# Patient Record
Sex: MALE | Race: BLACK OR AFRICAN AMERICAN | NOT HISPANIC OR LATINO | Employment: UNEMPLOYED | URBAN - METROPOLITAN AREA
[De-identification: names, ages, dates, MRNs, and addresses within clinical notes are randomized per-mention and may not be internally consistent; named-entity substitution may affect disease eponyms.]

---

## 2017-05-30 ENCOUNTER — HISTORICAL (OUTPATIENT)
Dept: ADMINISTRATIVE | Facility: HOSPITAL | Age: 62
End: 2017-05-30

## 2017-05-30 LAB
ABS NEUT (OLG): 1.27 X10(3)/MCL (ref 2.1–9.2)
ALBUMIN SERPL-MCNC: 3.7 GM/DL (ref 3.4–5)
ALBUMIN/GLOB SERPL: 1 RATIO (ref 1–2)
ALP SERPL-CCNC: 50 UNIT/L (ref 20–120)
ALT SERPL-CCNC: 21 UNIT/L
ANISOCYTOSIS BLD QL SMEAR: NORMAL
APPEARANCE, UA: CLEAR
AST SERPL-CCNC: 25 UNIT/L
BACTERIA #/AREA URNS AUTO: ABNORMAL /[HPF]
BASOPHILS NFR BLD MANUAL: 1 %
BILIRUB SERPL-MCNC: 0.6 MG/DL
BILIRUB UR QL STRIP: NEGATIVE
BILIRUBIN DIRECT+TOT PNL SERPL-MCNC: <0.1 MG/DL
BILIRUBIN DIRECT+TOT PNL SERPL-MCNC: >0.5 MG/DL
BUN SERPL-MCNC: 17 MG/DL (ref 7–25)
CALCIUM SERPL-MCNC: 8.8 MG/DL (ref 8.4–10.3)
CHLORIDE SERPL-SCNC: 104 MMOL/L (ref 96–110)
CHOLEST SERPL-MCNC: 194 MG/DL
CHOLEST/HDLC SERPL: 3.2 {RATIO} (ref 0–5)
CO2 SERPL-SCNC: 31 MMOL/L (ref 24–32)
COLOR UR: YELLOW
CREAT SERPL-MCNC: 1.07 MG/DL (ref 0.7–1.4)
EOSINOPHIL NFR BLD MANUAL: 0 %
ERYTHROCYTE [DISTWIDTH] IN BLOOD BY AUTOMATED COUNT: 14.3 % (ref 11.5–14.5)
EST. AVERAGE GLUCOSE BLD GHB EST-MCNC: 108 MG/DL
GLOBULIN SER-MCNC: 3.2 GM/ML (ref 2.3–3.5)
GLUCOSE (UA): NORMAL
GLUCOSE SERPL-MCNC: 97 MG/DL (ref 65–99)
GRANULOCYTES NFR BLD MANUAL: 36 %
HBA1C MFR BLD: 5.4 % (ref 4.7–5.6)
HCT VFR BLD AUTO: 40.2 % (ref 40–51)
HDLC SERPL-MCNC: 60 MG/DL
HGB BLD-MCNC: 12.7 GM/DL (ref 13.5–17.5)
HGB UR QL STRIP: 0.06 MG/DL
HYALINE CASTS #/AREA URNS LPF: ABNORMAL /[LPF]
KETONES UR QL STRIP: ABNORMAL
LDLC SERPL CALC-MCNC: 121 MG/DL (ref 0–130)
LEUKOCYTE ESTERASE UR QL STRIP: NEGATIVE
LYMPHOCYTES NFR BLD MANUAL: 50 %
MACROCYTES BLD QL SMEAR: NORMAL
MCH RBC QN AUTO: 26.6 PG (ref 26–34)
MCHC RBC AUTO-ENTMCNC: 31.6 GM/DL (ref 31–37)
MCV RBC AUTO: 84.3 FL (ref 80–100)
MONOCYTES NFR BLD MANUAL: 8 %
NITRITE UR QL STRIP: NEGATIVE
PH UR STRIP: 5.5 [PH] (ref 4.5–8)
PLATELET # BLD AUTO: 190 X10(3)/MCL (ref 130–400)
PLATELET # BLD EST: ADEQUATE 10*3/UL
PLATELET # BLD EST: ADEQUATE 10*3/UL
PMV BLD AUTO: 10 FL (ref 7.4–10.4)
POIKILOCYTOSIS BLD QL SMEAR: NORMAL
POTASSIUM SERPL-SCNC: 4.8 MMOL/L (ref 3.6–5.2)
PROT SERPL-MCNC: 6.9 GM/DL (ref 6–8)
PROT UR QL STRIP: NEGATIVE
RBC # BLD AUTO: 4.77 X10(6)/MCL (ref 4.5–5.9)
RBC #/AREA URNS AUTO: ABNORMAL /[HPF]
RBC MORPH BLD: NORMAL
SODIUM SERPL-SCNC: 140 MMOL/L (ref 135–146)
SP GR UR STRIP: 1.03 (ref 1–1.03)
SQUAMOUS #/AREA URNS LPF: ABNORMAL /[LPF]
TRIGL SERPL-MCNC: 65 MG/DL
TSH SERPL-ACNC: 1.91 MIU/L (ref 0.5–5)
UROBILINOGEN UR STRIP-ACNC: NORMAL
VLDLC SERPL CALC-MCNC: 13 MG/DL
WBC # SPEC AUTO: 4.1 X10(3)/MCL (ref 4.5–11)
WBC #/AREA URNS AUTO: ABNORMAL /HPF

## 2017-09-13 ENCOUNTER — HOSPITAL ENCOUNTER (OUTPATIENT)
Dept: MEDSURG UNIT | Facility: HOSPITAL | Age: 62
End: 2017-09-13
Attending: FAMILY MEDICINE | Admitting: FAMILY MEDICINE

## 2017-09-13 LAB
ABS NEUT (OLG): 2.18 X10(3)/MCL (ref 2.1–9.2)
ALBUMIN SERPL-MCNC: 3.5 GM/DL (ref 3.4–5)
ALBUMIN/GLOB SERPL: 1 RATIO (ref 1–2)
ALP SERPL-CCNC: 55 UNIT/L (ref 45–117)
ALT SERPL-CCNC: 36 UNIT/L (ref 12–78)
AMPHET UR QL SCN: NEGATIVE
APPEARANCE, UA: CLEAR
APTT PPP: 32.2 SECOND(S) (ref 23.3–37)
AST SERPL-CCNC: 21 UNIT/L (ref 15–37)
BACTERIA #/AREA URNS AUTO: ABNORMAL /[HPF]
BARBITURATE SCN PRESENT UR: NEGATIVE
BASOPHILS # BLD AUTO: 0.05 X10(3)/MCL
BASOPHILS NFR BLD AUTO: 1 % (ref 0–1)
BENZODIAZ UR QL SCN: NEGATIVE
BILIRUB SERPL-MCNC: 0.6 MG/DL (ref 0.2–1)
BILIRUB UR QL STRIP: NEGATIVE
BILIRUBIN DIRECT+TOT PNL SERPL-MCNC: 0.2 MG/DL
BILIRUBIN DIRECT+TOT PNL SERPL-MCNC: 0.4 MG/DL
BUN SERPL-MCNC: 12 MG/DL (ref 7–18)
CALCIUM SERPL-MCNC: 8.5 MG/DL (ref 8.5–10.1)
CANNABINOIDS UR QL SCN: NEGATIVE
CHLORIDE SERPL-SCNC: 104 MMOL/L (ref 98–107)
CK MB SERPL-MCNC: 1.6 NG/ML (ref 1–3.6)
CK SERPL-CCNC: 265 UNIT/L (ref 39–308)
CO2 SERPL-SCNC: 29 MMOL/L (ref 21–32)
COCAINE UR QL SCN: NEGATIVE
COLOR UR: YELLOW
CREAT SERPL-MCNC: 1 MG/DL (ref 0.6–1.3)
EOSINOPHIL # BLD AUTO: 0.13 X10(3)/MCL
EOSINOPHIL NFR BLD AUTO: 3 % (ref 0–5)
ERYTHROCYTE [DISTWIDTH] IN BLOOD BY AUTOMATED COUNT: 15.3 % (ref 11.5–14.5)
GLOBULIN SER-MCNC: 4 GM/ML (ref 2.3–3.5)
GLUCOSE (UA): NORMAL
GLUCOSE SERPL-MCNC: 92 MG/DL (ref 74–106)
HCT VFR BLD AUTO: 38.6 % (ref 40–51)
HGB BLD-MCNC: 12.5 GM/DL (ref 13.5–17.5)
HGB UR QL STRIP: NEGATIVE
HYALINE CASTS #/AREA URNS LPF: ABNORMAL /[LPF]
IMM GRANULOCYTES # BLD AUTO: 0.01 10*3/UL
IMM GRANULOCYTES NFR BLD AUTO: 0 %
INR PPP: 1.07 (ref 0.9–1.2)
KETONES UR QL STRIP: NEGATIVE
LEUKOCYTE ESTERASE UR QL STRIP: NEGATIVE
LYMPHOCYTES # BLD AUTO: 1.64 X10(3)/MCL
LYMPHOCYTES NFR BLD AUTO: 38 % (ref 15–40)
MCH RBC QN AUTO: 26.7 PG (ref 26–34)
MCHC RBC AUTO-ENTMCNC: 32.4 GM/DL (ref 31–37)
MCV RBC AUTO: 82.5 FL (ref 80–100)
MONOCYTES # BLD AUTO: 0.35 X10(3)/MCL
MONOCYTES NFR BLD AUTO: 8 % (ref 4–12)
NEUTROPHILS # BLD AUTO: 2.18 X10(3)/MCL
NEUTROPHILS NFR BLD AUTO: 50 X10(3)/MCL
NITRITE UR QL STRIP: NEGATIVE
OPIATES UR QL SCN: NEGATIVE
PCP UR QL: NEGATIVE
PH UR STRIP.AUTO: 7 [PH] (ref 5–8)
PH UR STRIP: 7 [PH] (ref 4.5–8)
PLATELET # BLD AUTO: 181 X10(3)/MCL (ref 130–400)
PMV BLD AUTO: 9.9 FL (ref 7.4–10.4)
POC TROPONIN: 0 NG/ML (ref 0–0.08)
POTASSIUM SERPL-SCNC: 3.8 MMOL/L (ref 3.5–5.1)
PROT SERPL-MCNC: 7.5 GM/DL (ref 6.4–8.2)
PROT UR QL STRIP: NEGATIVE
PROTHROMBIN TIME: 13.7 SECOND(S) (ref 11.9–14.4)
RBC # BLD AUTO: 4.68 X10(6)/MCL (ref 4.5–5.9)
RBC #/AREA URNS AUTO: ABNORMAL /[HPF]
SODIUM SERPL-SCNC: 140 MMOL/L (ref 136–145)
SP GR UR STRIP: 1.02 (ref 1–1.03)
SQUAMOUS #/AREA URNS LPF: ABNORMAL /[LPF]
TEMPERATURE, URINE (OHS): 24 DEGC (ref 20–25)
TROPONIN I SERPL-MCNC: <0.015 NG/ML (ref 0–0.05)
TSH SERPL-ACNC: 2.66 MIU/L (ref 0.36–3.74)
UROBILINOGEN UR STRIP-ACNC: NORMAL
WBC # SPEC AUTO: 4.4 X10(3)/MCL (ref 4.5–11)
WBC #/AREA URNS AUTO: ABNORMAL /HPF

## 2017-10-17 ENCOUNTER — HISTORICAL (OUTPATIENT)
Dept: FAMILY MEDICINE | Facility: CLINIC | Age: 62
End: 2017-10-17

## 2017-10-25 ENCOUNTER — HISTORICAL (OUTPATIENT)
Dept: ADMINISTRATIVE | Facility: HOSPITAL | Age: 62
End: 2017-10-25

## 2017-11-15 ENCOUNTER — HISTORICAL (OUTPATIENT)
Dept: RADIOLOGY | Facility: HOSPITAL | Age: 62
End: 2017-11-15

## 2018-01-31 ENCOUNTER — HISTORICAL (OUTPATIENT)
Dept: ADMINISTRATIVE | Facility: HOSPITAL | Age: 63
End: 2018-01-31

## 2019-05-17 ENCOUNTER — HISTORICAL (OUTPATIENT)
Dept: ADMINISTRATIVE | Facility: HOSPITAL | Age: 64
End: 2019-05-17

## 2019-05-17 LAB
CHOLEST SERPL-MCNC: 202 MG/DL
CHOLEST/HDLC SERPL: 2.8 {RATIO} (ref 0–5)
EST. AVERAGE GLUCOSE BLD GHB EST-MCNC: 114 MG/DL
FERRITIN SERPL-MCNC: 82.8 NG/ML (ref 26–388)
HBA1C MFR BLD: 5.6 % (ref 4.2–6.3)
HDLC SERPL-MCNC: 71 MG/DL
IRON SATN MFR SERPL: 15.7 % (ref 15–50)
IRON SERPL-MCNC: 63 MCG/DL (ref 65–175)
LDLC SERPL CALC-MCNC: 119 MG/DL (ref 0–130)
TIBC SERPL-MCNC: 401 MCG/DL (ref 250–450)
TRANSFERRIN SERPL-MCNC: 256 MG/DL (ref 200–360)
TRIGL SERPL-MCNC: 61 MG/DL
VLDLC SERPL CALC-MCNC: 12 MG/DL

## 2019-05-22 ENCOUNTER — HISTORICAL (OUTPATIENT)
Dept: FAMILY MEDICINE | Facility: CLINIC | Age: 64
End: 2019-05-22

## 2019-09-10 ENCOUNTER — HISTORICAL (OUTPATIENT)
Dept: CARDIOLOGY | Facility: CLINIC | Age: 64
End: 2019-09-10

## 2019-09-10 LAB
ALBUMIN SERPL-MCNC: 3.5 GM/DL (ref 3.4–5)
ALBUMIN/GLOB SERPL: 1 RATIO (ref 1.1–2)
ALP SERPL-CCNC: 60 UNIT/L (ref 45–117)
ALT SERPL-CCNC: 26 UNIT/L (ref 12–78)
AST SERPL-CCNC: 21 UNIT/L (ref 15–37)
BILIRUB SERPL-MCNC: 0.5 MG/DL (ref 0.2–1)
BILIRUBIN DIRECT+TOT PNL SERPL-MCNC: 0.1 MG/DL
BILIRUBIN DIRECT+TOT PNL SERPL-MCNC: 0.4 MG/DL
BUN SERPL-MCNC: 17 MG/DL (ref 7–18)
CALCIUM SERPL-MCNC: 8.7 MG/DL (ref 8.5–10.1)
CHLORIDE SERPL-SCNC: 107 MMOL/L (ref 98–107)
CHOLEST SERPL-MCNC: 200 MG/DL
CHOLEST/HDLC SERPL: 2.8 {RATIO} (ref 0–5)
CO2 SERPL-SCNC: 34 MMOL/L (ref 21–32)
CREAT SERPL-MCNC: 1.4 MG/DL (ref 0.6–1.3)
GLOBULIN SER-MCNC: 3.6 GM/ML (ref 2.3–3.5)
GLUCOSE SERPL-MCNC: 100 MG/DL (ref 74–106)
HDLC SERPL-MCNC: 72 MG/DL (ref 40–59)
LDLC SERPL CALC-MCNC: 116 MG/DL
POTASSIUM SERPL-SCNC: 4.5 MMOL/L (ref 3.5–5.1)
PROT SERPL-MCNC: 7.1 GM/DL (ref 6.4–8.2)
SODIUM SERPL-SCNC: 143 MMOL/L (ref 136–145)
TRIGL SERPL-MCNC: 58 MG/DL
VLDLC SERPL CALC-MCNC: 12 MG/DL

## 2019-10-24 ENCOUNTER — HISTORICAL (OUTPATIENT)
Dept: FAMILY MEDICINE | Facility: CLINIC | Age: 64
End: 2019-10-24

## 2019-11-01 ENCOUNTER — HISTORICAL (OUTPATIENT)
Dept: RADIOLOGY | Facility: HOSPITAL | Age: 64
End: 2019-11-01

## 2020-01-24 ENCOUNTER — HISTORICAL (OUTPATIENT)
Dept: ADMINISTRATIVE | Facility: HOSPITAL | Age: 65
End: 2020-01-24

## 2020-01-24 LAB — TESTOST SERPL-MCNC: 487 NG/DL (ref 241–827)

## 2020-03-11 ENCOUNTER — HISTORICAL (OUTPATIENT)
Dept: RADIOLOGY | Facility: HOSPITAL | Age: 65
End: 2020-03-11

## 2020-06-15 ENCOUNTER — HISTORICAL (OUTPATIENT)
Dept: CARDIOLOGY | Facility: HOSPITAL | Age: 65
End: 2020-06-15

## 2020-08-10 ENCOUNTER — HISTORICAL (OUTPATIENT)
Dept: CARDIOLOGY | Facility: CLINIC | Age: 65
End: 2020-08-10

## 2020-08-10 LAB
CHOLEST SERPL-MCNC: 227 MG/DL
CHOLEST/HDLC SERPL: 3.4 {RATIO} (ref 0–5)
HDLC SERPL-MCNC: 67 MG/DL (ref 40–59)
LDLC SERPL CALC-MCNC: 149 MG/DL
TRIGL SERPL-MCNC: 55 MG/DL
VLDLC SERPL CALC-MCNC: 11 MG/DL

## 2020-11-12 ENCOUNTER — HISTORICAL (OUTPATIENT)
Dept: CARDIOLOGY | Facility: CLINIC | Age: 65
End: 2020-11-12

## 2020-11-12 LAB
T4 FREE SERPL-MCNC: 0.94 NG/DL (ref 0.7–1.48)
TSH SERPL-ACNC: 1.15 UIU/ML (ref 0.35–4.94)

## 2020-11-14 ENCOUNTER — HISTORICAL (OUTPATIENT)
Dept: ADMINISTRATIVE | Facility: HOSPITAL | Age: 65
End: 2020-11-14

## 2020-11-14 LAB — STREP A PCR (OHS): NOT DETECTED

## 2021-01-15 ENCOUNTER — HISTORICAL (OUTPATIENT)
Dept: ADMINISTRATIVE | Facility: HOSPITAL | Age: 66
End: 2021-01-15

## 2021-01-15 LAB
BUN SERPL-MCNC: 15 MG/DL (ref 8.4–25.7)
CALCIUM SERPL-MCNC: 8.7 MG/DL (ref 8.8–10)
CHLORIDE SERPL-SCNC: 105 MMOL/L (ref 98–107)
CHOLEST SERPL-MCNC: 198 MG/DL
CHOLEST/HDLC SERPL: 3 {RATIO} (ref 0–5)
CO2 SERPL-SCNC: 31 MMOL/L (ref 23–31)
CREAT SERPL-MCNC: 1.14 MG/DL (ref 0.73–1.18)
CREAT/UREA NIT SERPL: 13
EST. AVERAGE GLUCOSE BLD GHB EST-MCNC: 108.3 MG/DL
GLUCOSE SERPL-MCNC: 85 MG/DL (ref 82–115)
HBA1C MFR BLD: 5.4 %
HDLC SERPL-MCNC: 63 MG/DL (ref 35–60)
LDLC SERPL CALC-MCNC: 122 MG/DL (ref 50–140)
POTASSIUM SERPL-SCNC: 3.9 MMOL/L (ref 3.5–5.1)
PSA SERPL-MCNC: 0.91 NG/ML
SODIUM SERPL-SCNC: 144 MMOL/L (ref 136–145)
TRIGL SERPL-MCNC: 63 MG/DL (ref 34–140)
VLDLC SERPL CALC-MCNC: 13 MG/DL

## 2021-04-14 ENCOUNTER — HISTORICAL (OUTPATIENT)
Dept: RADIOLOGY | Facility: HOSPITAL | Age: 66
End: 2021-04-14

## 2021-05-21 ENCOUNTER — HISTORICAL (OUTPATIENT)
Dept: CARDIOLOGY | Facility: HOSPITAL | Age: 66
End: 2021-05-21

## 2021-05-21 LAB
ALBUMIN SERPL-MCNC: 3.6 GM/DL (ref 3.4–4.8)
ALBUMIN/GLOB SERPL: 1.1 RATIO (ref 1.1–2)
ALP SERPL-CCNC: 59 UNIT/L (ref 40–150)
ALT SERPL-CCNC: 19 UNIT/L (ref 0–55)
AST SERPL-CCNC: 20 UNIT/L (ref 5–34)
BILIRUB SERPL-MCNC: 0.8 MG/DL
BILIRUBIN DIRECT+TOT PNL SERPL-MCNC: 0.3 MG/DL (ref 0–0.5)
BILIRUBIN DIRECT+TOT PNL SERPL-MCNC: 0.5 MG/DL (ref 0–0.8)
BUN SERPL-MCNC: 14.5 MG/DL (ref 8.4–25.7)
CALCIUM SERPL-MCNC: 9.2 MG/DL (ref 8.8–10)
CHLORIDE SERPL-SCNC: 105 MMOL/L (ref 98–107)
CHOLEST SERPL-MCNC: 191 MG/DL
CHOLEST/HDLC SERPL: 4 {RATIO} (ref 0–5)
CO2 SERPL-SCNC: 29 MMOL/L (ref 23–31)
CREAT SERPL-MCNC: 1.24 MG/DL (ref 0.73–1.18)
GLOBULIN SER-MCNC: 3.3 GM/DL (ref 2.4–3.5)
GLUCOSE SERPL-MCNC: 98 MG/DL (ref 82–115)
HDLC SERPL-MCNC: 54 MG/DL (ref 35–60)
LDLC SERPL CALC-MCNC: 123 MG/DL (ref 50–140)
POTASSIUM SERPL-SCNC: 4.1 MMOL/L (ref 3.5–5.1)
PROT SERPL-MCNC: 6.9 GM/DL (ref 5.8–7.6)
SODIUM SERPL-SCNC: 141 MMOL/L (ref 136–145)
TRIGL SERPL-MCNC: 70 MG/DL (ref 34–140)

## 2021-07-13 ENCOUNTER — HISTORICAL (OUTPATIENT)
Dept: CARDIOLOGY | Facility: HOSPITAL | Age: 66
End: 2021-07-13

## 2021-07-13 LAB
ABS NEUT (OLG): 0.86 X10(3)/MCL (ref 2.1–9.2)
APTT PPP: 53.5 SECOND(S) (ref 23.3–37)
BASOPHILS NFR BLD MANUAL: 2 %
BUN SERPL-MCNC: 16.4 MG/DL (ref 8.4–25.7)
CALCIUM SERPL-MCNC: 9 MG/DL (ref 8.8–10)
CHLORIDE SERPL-SCNC: 105 MMOL/L (ref 98–107)
CO2 SERPL-SCNC: 31 MMOL/L (ref 23–31)
CREAT SERPL-MCNC: 1.21 MG/DL (ref 0.73–1.18)
CREAT/UREA NIT SERPL: 14
EOSINOPHIL NFR BLD MANUAL: 4 %
ERYTHROCYTE [DISTWIDTH] IN BLOOD BY AUTOMATED COUNT: 15 % (ref 11.5–14.5)
GLUCOSE SERPL-MCNC: 92 MG/DL (ref 82–115)
HCT VFR BLD AUTO: 44.2 % (ref 40–51)
HGB BLD-MCNC: 13.7 GM/DL (ref 13.5–17.5)
INR PPP: 1.65 (ref 0.9–1.2)
LYMPHOCYTES NFR BLD MANUAL: 61 % (ref 20.5–51.1)
MCH RBC QN AUTO: 26.7 PG (ref 26–34)
MCHC RBC AUTO-ENTMCNC: 31 GM/DL (ref 31–37)
MCV RBC AUTO: 86.2 FL (ref 80–100)
MONOCYTES NFR BLD MANUAL: 12 % (ref 2–9)
NEUTROPHILS NFR BLD MANUAL: 21 % (ref 47–80)
PLATELET # BLD AUTO: ABNORMAL 10*3/UL (ref 130–400)
PLATELET # BLD EST: ABNORMAL 10*3/UL
PMV BLD AUTO: ABNORMAL FL (ref 7.4–10.4)
POTASSIUM SERPL-SCNC: 4.3 MMOL/L (ref 3.5–5.1)
PROTHROMBIN TIME: 19.3 SECOND(S) (ref 11.9–14.4)
RBC # BLD AUTO: 5.13 X10(6)/MCL (ref 4.5–5.9)
RBC MORPH BLD: NORMAL
SODIUM SERPL-SCNC: 140 MMOL/L (ref 136–145)
WBC # SPEC AUTO: 3.7 X10(3)/MCL (ref 4.5–11)

## 2021-07-21 ENCOUNTER — HOSPITAL ENCOUNTER (OUTPATIENT)
Dept: MEDSURG UNIT | Facility: HOSPITAL | Age: 66
End: 2021-07-22
Attending: INTERNAL MEDICINE | Admitting: INTERNAL MEDICINE

## 2021-07-21 LAB
APTT PPP: 29.8 SECOND(S) (ref 23.3–37)
APTT PPP: 96.6 SECOND(S) (ref 23.3–37)
INR PPP: 1.24 (ref 0.9–1.2)
INR PPP: 4.78 (ref 0.9–1.2)
PROTHROMBIN TIME: 15.4 SECOND(S) (ref 11.9–14.4)
PROTHROMBIN TIME: 44.6 SECOND(S) (ref 11.9–14.4)
SARS-COV-2 AG RESP QL IA.RAPID: NEGATIVE

## 2021-07-22 LAB
ABS NEUT (OLG): 2.73 X10(3)/MCL (ref 2.1–9.2)
ALBUMIN SERPL-MCNC: 3.2 GM/DL (ref 3.4–4.8)
ALBUMIN/GLOB SERPL: 1.1 RATIO (ref 1.1–2)
ALP SERPL-CCNC: 61 UNIT/L (ref 40–150)
ALT SERPL-CCNC: 16 UNIT/L (ref 0–55)
AST SERPL-CCNC: 22 UNIT/L (ref 5–34)
BASOPHILS # BLD AUTO: 0 X10(3)/MCL (ref 0–0.2)
BASOPHILS NFR BLD AUTO: 1 %
BILIRUB SERPL-MCNC: 0.4 MG/DL
BILIRUBIN DIRECT+TOT PNL SERPL-MCNC: 0.1 MG/DL (ref 0–0.5)
BILIRUBIN DIRECT+TOT PNL SERPL-MCNC: 0.3 MG/DL (ref 0–0.8)
BUN SERPL-MCNC: 14.8 MG/DL (ref 8.4–25.7)
CALCIUM SERPL-MCNC: 8.3 MG/DL (ref 8.8–10)
CHLORIDE SERPL-SCNC: 104 MMOL/L (ref 98–107)
CO2 SERPL-SCNC: 29 MMOL/L (ref 23–31)
CREAT SERPL-MCNC: 1.08 MG/DL (ref 0.73–1.18)
EOSINOPHIL # BLD AUTO: 0.2 X10(3)/MCL (ref 0–0.9)
EOSINOPHIL NFR BLD AUTO: 3 %
ERYTHROCYTE [DISTWIDTH] IN BLOOD BY AUTOMATED COUNT: 15.2 % (ref 11.5–14.5)
GLOBULIN SER-MCNC: 2.9 GM/DL (ref 2.4–3.5)
GLUCOSE SERPL-MCNC: 104 MG/DL (ref 82–115)
HCT VFR BLD AUTO: 39.9 % (ref 40–51)
HGB BLD-MCNC: 12.5 GM/DL (ref 13.5–17.5)
IMM GRANULOCYTES # BLD AUTO: 0.01 10*3/UL
IMM GRANULOCYTES NFR BLD AUTO: 0 %
LYMPHOCYTES # BLD AUTO: 1.7 X10(3)/MCL (ref 0.6–4.6)
LYMPHOCYTES NFR BLD AUTO: 32 %
MCH RBC QN AUTO: 26.7 PG (ref 26–34)
MCHC RBC AUTO-ENTMCNC: 31.3 GM/DL (ref 31–37)
MCV RBC AUTO: 85.3 FL (ref 80–100)
MONOCYTES # BLD AUTO: 0.7 X10(3)/MCL (ref 0.1–1.3)
MONOCYTES NFR BLD AUTO: 13 %
NEUTROPHILS # BLD AUTO: 2.73 X10(3)/MCL (ref 2.1–9.2)
NEUTROPHILS NFR BLD AUTO: 51 %
NRBC BLD AUTO-RTO: 0 % (ref 0–0.2)
PLATELET # BLD AUTO: 155 X10(3)/MCL (ref 130–400)
PMV BLD AUTO: 10.2 FL (ref 7.4–10.4)
POTASSIUM SERPL-SCNC: 4.2 MMOL/L (ref 3.5–5.1)
PROT SERPL-MCNC: 6.1 GM/DL (ref 5.8–7.6)
RBC # BLD AUTO: 4.68 X10(6)/MCL (ref 4.5–5.9)
SODIUM SERPL-SCNC: 137 MMOL/L (ref 136–145)
WBC # SPEC AUTO: 5.3 X10(3)/MCL (ref 4.5–11)

## 2021-09-27 ENCOUNTER — HISTORICAL (OUTPATIENT)
Dept: CARDIOLOGY | Facility: CLINIC | Age: 66
End: 2021-09-27

## 2021-09-27 LAB
BUN SERPL-MCNC: 15.5 MG/DL (ref 8.4–25.7)
CALCIUM SERPL-MCNC: 9.3 MG/DL (ref 8.8–10)
CHLORIDE SERPL-SCNC: 105 MMOL/L (ref 98–107)
CO2 SERPL-SCNC: 28 MMOL/L (ref 23–31)
CREAT SERPL-MCNC: 1.23 MG/DL (ref 0.73–1.18)
CREAT/UREA NIT SERPL: 13
GLUCOSE SERPL-MCNC: 102 MG/DL (ref 82–115)
MAGNESIUM SERPL-MCNC: 2 MG/DL (ref 1.6–2.6)
POTASSIUM SERPL-SCNC: 4 MMOL/L (ref 3.5–5.1)
SODIUM SERPL-SCNC: 141 MMOL/L (ref 136–145)

## 2022-02-28 LAB — HEMOCCULT STL QL IA: NEGATIVE

## 2022-04-07 ENCOUNTER — HISTORICAL (OUTPATIENT)
Dept: ADMINISTRATIVE | Facility: HOSPITAL | Age: 67
End: 2022-04-07
Payer: MEDICARE

## 2022-04-24 VITALS
DIASTOLIC BLOOD PRESSURE: 72 MMHG | WEIGHT: 167.75 LBS | OXYGEN SATURATION: 100 % | HEIGHT: 70 IN | BODY MASS INDEX: 24.02 KG/M2 | SYSTOLIC BLOOD PRESSURE: 110 MMHG

## 2022-04-30 NOTE — ED PROVIDER NOTES
Patient:   Rad Reyna             MRN: [id number]            FIN: [id number]               Age:   62 years     Sex:  Male     :  1955   Associated Diagnoses:   Bradycardia; Atypical chest pain   Author:   Brett Nogueira MD      Basic Information   Time seen: Date & time 2017 07:55:00.   History source: Patient.   Arrival mode: Private vehicle.   History limitation: None.   Additional information: Chief Complaint from Nursing Triage Note : Chief Complaint   2017 7:23 CDT       Chief Complaint           pt w co CP TO LT CHEST THAT RAD TO BACK SINCE LAST PM.  PT HX OF BRADYCARDIA,  EKG OBTAINED HR 39. TO AA.  .      History of Present Illness   The patient presents with bradycardia.  The onset was chronic.  The course/duration of symptoms is constant.  Character of symptoms slow.  The exacerbating factor is none.  The relieving factor is none.  Risk factors consist of hyperlipidemia.  Prior episodes: chronic.  Therapy today: none.  Associated symptoms: chest pain and nausea.  Additional history: 62 y/oi male with history of bradycardia, on monitor with cardiology, comes in today complain of epigastric pain that started yesterda after eating, patient states that pain kept him up at night.  no dizziness, no weakness, no fatigue, no palpitations, no SOB, denies chest pain..     The patient presents with chest pain and indigestion.  The onset was 9  hours ago.  The course/duration of symptoms is fluctuating in intensity.  Location: Substernal chest epigastric. Radiating pain: none. The character of symptoms is achy and like indegestion.  The degree at maximum was 5 /10.  .  The degree at present is 7 /10.  The exacerbating factor is none.  The relieving factor is none.  Risk factors consist of HLD, GERD, Hx of bradycardia/..        Review of Systems   Constitutional symptoms:  No fever, no chills, no sweats, no weakness, no fatigue.    Skin symptoms:  No jaundice, no rash, no pruritus.     Eye symptoms:  No pain, no discharge, no icterus, no diplopia, no blurred vision.    ENMT symptoms:  No sore throat, no nasal congestion, no sinus pain.    Respiratory symptoms:  No shortness of breath, no orthopnea, no cough, no hemoptysis, no sputum production.    Cardiovascular symptoms:  No chest pain, no palpitations, no tachycardia, no syncope, no diaphoresis.    Gastrointestinal symptoms:  Negative except as documented in HPI.   Genitourinary symptoms:  No dysuria, no hematuria, no discharge, no testicular pain.    Musculoskeletal symptoms:  No back pain, no Muscle pain.    Psychiatric symptoms:  Anxiety.   Hematologic/Lymphatic symptoms:  Bleeding tendency negative, bruising tendency negative, no petechiae, no gum bleeding.              Additional review of systems information: All other systems reviewed and otherwise negative.      Health Status   Allergies:    Allergic Reactions (Selected)  Severity Not Documented  Penicillins- No reactions were documented..   Medications:  (Selected)   Prescriptions  Prescribed  Bactroban 2% Ointment (22.5 gmTube): 1 valentin, TOP, BID, # 23 gm, 0 Refill(s)  Prilosec 40 mg oral DR capsule: 40 mg = 1 cap(s), Oral, Daily, # 30 cap(s), 0 Refill(s), other reason (Rx)  pravastatin 40 mg oral tablet: 40 mg = 1 tab(s), Oral, Daily, # 90 tab(s), 1 Refill(s), Pharmacy: [id number].      Past Medical/ Family/ Social History   Medical history:    Resolved  Sinus bradycardia ([id number]):  Resolved.  Prostatitis ([id number]):  Resolved., Reviewed as documented in chart.   Surgical history:    Laparoscopy Exploratory (None) on 9/25/2015 at 60 Years.  Comments:  9/25/2015 12:22 - Lynn FISHMAN, Priscilla BROWER  auto-populated from documented surgical case  Colonoscopy ([id number]).  Appendectomy ([id number])., Reviewed as documented in chart.   Family history: Reviewed as documented in chart.   Social history: Reviewed as documented in chart.      Physical  Examination               Vital Signs   Vital Signs   9/13/2017 7:50 CDT       Peripheral Pulse Rate     38 bpm  LOW                             Respiratory Rate          15 br/min                             SpO2                      100 %                             Oxygen Therapy            Room air                             Systolic Blood Pressure   132 mmHg                             Diastolic Blood Pressure  76 mmHg                             Mean Arterial Pressure, Cuff              95 mmHg  .   General:  Alert, no acute distress.    Skin:  Warm, dry.    Head:  Normocephalic.   Neck:  Supple, trachea midline, no tenderness, no JVD, no carotid bruit.    Eye:  Pupils are equal, round and reactive to light, extraocular movements are intact.    Ears, nose, mouth and throat:  Tympanic membranes clear, oral mucosa moist.    Cardiovascular:  No murmur, Normal peripheral perfusion, No edema, Bradycardia.    Respiratory:  Lungs are clear to auscultation, respirations are non-labored, breath sounds are equal, Symmetrical chest wall expansion.    Chest wall:  No tenderness.   Back:  Normal range of motion.   Musculoskeletal:  Normal ROM, no deformity.    Gastrointestinal:  Soft, Nontender.    Genitourinary:  No tenderness.   Neurological:  Alert and oriented to person, place, time, and situation, No focal neurological deficit observed, CN II-XII intact, normal sensory observed, normal motor observed, normal speech observed, normal coordination observed.    Lymphatics:  No lymphadenopathy.   Psychiatric:  Cooperative.      Medical Decision Making   Differential Diagnosis::  Bradycardia.   Differential Diagnosis:  Non-ST elevation myocardial infarction, atypical chest pain, gastroesophageal reflux disease, congestive heart failure, chronic obstructive pulmonary disease.    Electrocardiogram:  Time 9/13/2017 07:27:00, rate 39, No ST-T changes, no ectopy, normal TN & QRS intervals, EP Interp.    Results review:  Lab  results : Lab View   9/13/2017 8:30 CDT       U pH                      7.0                             UA Appear                 Clear                             UA Color                  YELLOW                             UA Spec Grav              1.018                             UA Bili                   Negative                             UA pH                     7.0                             UA Urobilinogen           Normal                             UA Blood                  Negative                             UA Glucose                Normal                             UA Ketones                Negative                             UA Protein                Negative                             UA Nitrite                Negative                             UA Leuk Est               Negative                             UA WBC Interp             0-2 /HPF                             UA RBC Interp             3-5                             UA Bact Interp            None Seen                             UA Squam Epi Interp       2-20                             UA Hyal Cast Interp       0-2                             U Temp                    24.0 DegC                             U Amph Scr                Negative                             U Teresita Scr                Negative                             U Benzodia Scr            Negative                             U Cannab Scr              Negative                             U Cocaine Scr             Negative                             U Opiate Scr              Negative                             U Phencyclidine Scr       Negative    9/13/2017 7:44 CDT       POC Troponin              0.00 ng/mL    9/13/2017 7:42 CDT       Sodium Lvl                140 mmol/L                             Potassium Lvl             3.8 mmol/L                             Chloride                  104 mmol/L                             CO2                       29 mmol/L                              Calcium Lvl               8.5 mg/dL                             Glucose Lvl               92 mg/dL                             BUN                       12 mg/dL                             Creatinine                1.00 mg/dL                             eGFR-AA                   98 mL/min                             eGFR-RAIZA                  81 mL/min  LOW                             Bili Total                0.6 mg/dL                             Bili Direct               0.2 mg/dL                             Bili Indirect             0.4 mg/dL                             AST                       21 unit/L                             ALT                       36 unit/L                             Alk Phos                  55 unit/L                             Total Protein             7.5 gm/dL                             Albumin Lvl               3.5 gm/dL                             Globulin                  4.00 gm/mL  HI                             A/G Ratio                 1 ratio                             Total CK                  265 unit/L                             CK MB                     1.6 ng/mL                             TSH                       2.660 mIU/L                             PT                        13.7 second(s)                             INR                       1.07                             PTT                       32.2 second(s)                             WBC                       4.4 x10(3)/mcL  LOW                             RBC                       4.68 x10(6)/mcL                             Hgb                       12.5 gm/dL  LOW                             Hct                       38.6 %  LOW                             Platelet                  181 x10(3)/mcL                             MCV                       82.5 fL                             MCH                       26.7 pg                             MCHC                       32.4 gm/dL                             RDW                       15.3 %  HI                             MPV                       9.9 fL                             Abs Neut                  2.18 x10(3)/mcL                             Neutro Auto               50 x10(3)/mcL  NA                             Lymph Auto                38 %                             Mono Auto                 8 %                             Eos Auto                  3 %                             Abs Eos                   0.13 x10(3)/mcL  NA                             Basophil Auto             1 %                             Abs Neutro                2.18 x10(3)/mcL  NA                             Abs Lymph                 1.64 x10(3)/mcL  NA                             Abs Mono                  0.35 x10(3)/mcL  NA                             Abs Baso                  0.05 x10(3)/mcL  NA                             IG%                       0 %  NA                             IG#                       0.0100  NA  .   Radiology results:  X-ray, chest, reveals no acute disease process, emergency physician interpretation: no airspace opacities or infiltrate, no PTX, no pleural effusion..       Reexamination/ Reevaluation   Time: 9/13/2017 09:30:00 .   Vital signs   results included from flowsheet : Vital Signs   9/13/2017 9:58 CDT       Peripheral Pulse Rate     38 bpm  LOW                             Heart Rate Monitored      38 bpm  LOW                             Respiratory Rate          7 br/min  LOW                             SpO2                      94 %                             Oxygen Therapy            Room air                             Systolic Blood Pressure   134 mmHg                             Diastolic Blood Pressure  85 mmHg                             Mean Arterial Pressure, Cuff              101 mmHg    9/13/2017 8:56 CDT       Peripheral Pulse Rate     40 bpm  LOW                             Heart Rate Monitored       38 bpm  LOW                             Respiratory Rate          12 br/min                             SpO2                      100 %                             Oxygen Therapy            Room air                             Systolic Blood Pressure   129 mmHg                             Diastolic Blood Pressure  76 mmHg                             Mean Arterial Pressure, Cuff              94 mmHg    9/13/2017 7:50 CDT       Peripheral Pulse Rate     38 bpm  LOW                             Respiratory Rate          15 br/min                             SpO2                      100 %                             Oxygen Therapy            Room air                             Systolic Blood Pressure   132 mmHg                             Diastolic Blood Pressure  76 mmHg                             Mean Arterial Pressure, Cuff              95 mmHg     Course: unchanged.   Pain status: unchanged.   Assessment: exam unchanged.   Interventions.   Notes: HR to low of 33 while in room with patient..      Impression and Plan   Diagnosis   Bradycardia ([id number] R00.1)   Atypical chest pain (LNF17-HE R07.89)      Calls-Consults   -  9/13/2017 10:07:00 , FM, consult.    Plan   Disposition: Admit time  9/13/2017 10:07:00, Place in Observation Telemetry Unit, Dispositioned by: Time: 9/13/2017 10:07:00, Erick PARK, Tej BROWER    Notes: Discussed with Dr Suarez.       Addendum      Teaching-Supervisory Addendum-Brief   I participated in the following activities of this patients care: the medical history, the physical exam, medical decision making.   I personally performed: supervision of the patient's care, the medical decision making.   The case was discussed with: the resident.   Evaluation and management service: I agree with the evaluation and management decisions made in this patient's care.   Results interpretation: I agree with the documentation of the study interpretation.   Notes: I, Dr. Nogueira, participated in the  exam and care of this patient and agree with the documented noted above with the included changes..

## 2022-04-30 NOTE — H&P
Patient:   Rad Reyna             MRN: [id number]            FIN: [id number]               Age:   62 years     Sex:  Male     :  1955   Associated Diagnoses:   None   Author:   Gretta PARK, Srinivasan GOSS      Basic Information   Source of history:  Self.    Referral source:  Emergency department.    History limitation:  None.       Chief Complaint   2017 7:23 CDT       pt w co CP TO LT CHEST THAT RAD TO BACK SINCE LAST PM.  PT HX OF BRADYCARDIA,  EKG OBTAINED HR 39. TO AA.      History of Present Illness   63 yo AAM with PMHx chronic bradycardia, mitral regurgitation, HLD, GERD presented to [facility] ED with epigastric pain that began last night around 22:00. Pt reports eating very large meal for dinner yesterday at 18:00. Pain described as sharp and cramping, began 30 minutes after laying in bed. Improved after getting up to urinate. Pain continued episodically worsening throughout the night when he was supine, improving when he sat upright. Pain radiates around L rib cage to back. Associated with belching. Pt takes his PPI only periodically PRN after large meals; did take omeprazole 40mg last night at 20:00. States pain feels similar to previous episodes of GERD. Denies fever, chills, nausea, diaphoresis, dizziness, presyncope, vision changes, weakness, SOB, pleuritic chest pain, or palpitations. In ED patient found to be bradycardic at low of 33bpm. Pt declined GI cocktail and aspirin. Pt aysmptomatic at time of interview.           Review of Systems   Constitutional:  No fever, No chills, No fatigue.    Eye:  No recent visual problem, No blurring, No double vision.    Ear/Nose/Mouth/Throat:  No nasal congestion, No sore throat, No tinnitus.    Respiratory:  +mild occasional cough, No shortness of breath, No wheezing.    Cardiovascular:  No palpitations, No peripheral edema, No syncope.    Gastrointestinal:  No nausea, No vomiting, No diarrhea, No constipation.    Genitourinary:  No dysuria, No  hematuria.    Musculoskeletal:  No joint pain, No muscle pain.    Integumentary:  No rash, No skin lesion.    Neurologic:  No numbness, No tingling, No confusion, No headache.       Histories   Past Medical History: bradycardia, HLD, GERD, recurrent R buttock abscesses, mitral regurgitation   Family History: Reports DM in his family; denies any FHx of cardiac problems, MI, or stroke   Procedure history:    Laparoscopy Exploratory (None) on 9/25/2015 at 60 Years.  Comments:  9/25/2015 12:22 - Lynn FISHMAN, Priscilla BROWER  auto-populated from documented surgical case  Colonoscopy ([id number]).  Appendectomy ([id number]).   Social History     Minimal smoking history age [age], occasional EtOH use, reports illicit drug use age 20, denies any hx of injection drug use.        Health Status   Allergies: Penicillin (hives)   Current medications:  (Selected)   Prescriptions  Prescribed  Bactroban 2% Ointment (22.5 gmTube): 1 valentin, TOP, BID, # 23 gm, 0 Refill(s)  Prilosec 40 mg oral DR capsule: 40 mg = 1 cap(s), Oral, Daily, # 30 cap(s), 0 Refill(s), other reason (Rx)  pravastatin 40 mg oral tablet: 40 mg = 1 tab(s), Oral, Daily, # 90 tab(s), 1 Refill(s), Pharmacy: [id number]      Physical Examination   Vital Signs   9/13/2017 10:55 CDT      Peripheral Pulse Rate     43 bpm  LOW                             Heart Rate Monitored      44 bpm  LOW                             Respiratory Rate          12 br/min                             SpO2                      100 %                             Oxygen Therapy            Room air                             Systolic Blood Pressure   130 mmHg                             Diastolic Blood Pressure  72 mmHg                             Mean Arterial Pressure, Cuff              91 mmHg     General:  Alert and oriented, No acute distress, Sitting comfortably in bed.    Eye:  Pupils are equal, round and reactive to light, Extraocular movements are intact, Normal conjunctiva.    HENT:   Oral mucosa is moist, No pharyngeal erythema, No sinus tenderness.    Neck:  Supple, Non-tender, No carotid bruit, No jugular venous distention, No lymphadenopathy, No thyromegaly.    Respiratory:  Lungs are clear to auscultation, Respirations are non-labored, Breath sounds are equal, Symmetrical chest wall expansion.    Cardiovascular:  Regular rhythm, Good pulses equal in all extremities, No edema, Bradycardic, Grade II/VI Systolic murmur heard loudest apex.    Gastrointestinal:  Soft, Non-tender, Non-distended, Normal bowel sounds, No organomegaly.    Musculoskeletal:  Normal range of motion, Normal strength, No tenderness, No swelling, No deformity.    Neurologic:  Alert, Oriented, No focal deficits, Cranial Nerves II-XII are grossly intact.       Review / Management   Laboratory Results   Today's Lab Results : PowerNote Discrete Results   9/13/2017 8:30 CDT       UA Appear                 Clear                             UA Color                  YELLOW                             UA Spec Grav              1.018                             UA Bili                   Negative                             UA pH                     7.0                             UA Urobilinogen           Normal                             UA Blood                  Negative                             UA Glucose                Normal                             UA Ketones                Negative                             UA Protein                Negative                             UA Nitrite                Negative                             UA Leuk Est               Negative                             UA WBC Interp             0-2 /HPF                             UA RBC Interp             3-5                             UA Bact Interp            None Seen                             UA Squam Epi Interp       2-20                             UA Hyal Cast Interp       0-2                             U Temp                     24.0 DegC                             U pH                      7.0                             U Amph Scr                Negative                             U Teresita Scr                Negative                             U Benzodia Scr            Negative                             U Cannab Scr              Negative                             U Cocaine Scr             Negative                             U Opiate Scr              Negative                             U Phencyclidine Scr       Negative    9/13/2017 7:44 CDT       POC Troponin              0.00 ng/mL    9/13/2017 7:42 CDT       WBC                       4.4 x10(3)/mcL  LOW                             RBC                       4.68 x10(6)/mcL                             Hgb                       12.5 gm/dL  LOW                             Hct                       38.6 %  LOW                             Platelet                  181 x10(3)/mcL                             MCV                       82.5 fL                             MCH                       26.7 pg                             MCHC                      32.4 gm/dL                             RDW                       15.3 %  HI                             MPV                       9.9 fL                             Abs Neut                  2.18 x10(3)/mcL                             Neutro Auto               50 x10(3)/mcL  NA                             Lymph Auto                38 %                             Mono Auto                 8 %                             Eos Auto                  3 %                             Abs Eos                   0.13 x10(3)/mcL  NA                             Basophil Auto             1 %                             Abs Neutro                2.18 x10(3)/mcL  NA                             Abs Lymph                 1.64 x10(3)/mcL  NA                             Abs Mono                  0.35 x10(3)/mcL  NA                             Abs Baso                   0.05 x10(3)/mcL  NA                             IG%                       0 %  NA                             IG#                       0.0100  NA                             PT                        13.7 second(s)                             INR                       1.07                             PTT                       32.2 second(s)                             Sodium Lvl                140 mmol/L                             Potassium Lvl             3.8 mmol/L                             Chloride                  104 mmol/L                             CO2                       29 mmol/L                             Calcium Lvl               8.5 mg/dL                             Glucose Lvl               92 mg/dL                             BUN                       12 mg/dL                             Creatinine                1.00 mg/dL                             eGFR-AA                   98 mL/min                             eGFR-RAIZA                  81 mL/min  LOW                             Bili Total                0.6 mg/dL                             Bili Direct               0.2 mg/dL                             Bili Indirect             0.4 mg/dL                             AST                       21 unit/L                             ALT                       36 unit/L                             Alk Phos                  55 unit/L                             Total Protein             7.5 gm/dL                             Albumin Lvl               3.5 gm/dL                             Globulin                  4.00 gm/mL  HI                             A/G Ratio                 1 ratio                             Total CK                  265 unit/L                             CK MB                     1.6 ng/mL                             TSH                       2.660 mIU/L        Radiology results   Rad Results (ST)   Accession: [id number]  Order: XR Chest 2 Views  Report  Dt/Tm: 09/13/2017 10:48  Report:   CHEST X-RAY, PA AND LATERAL VIEWS     HISTORY: Chest Pain     COMPARISON: 7/11/2017.     FINDINGS:       No focal consolidations, pleural effusions or pneumothoraces.  Heart size within normal limits. Mildly tortuous thoracic aorta.  No acute bony pathology.  Soft tissues within normal limits.       IMPRESSION:     No acute thoracic abnormality.  No significant interval change.            Impression and Plan   Atypical chest pain with bradycardia  -admit observation status to telemetry  -pain currently resolved, continue to monitor  -initial EKG no significant change from previous, initial troponin negative  -most likely due to GERD, however will consult cardiology for recs  -per discussion with cards, will order TTE, trend troponins and EKGs, consider outpatient stress testing    Cardiac murmur  -last TTE 12/2015: LVEF >55%, moderate MR, mild TR  -c/w MR, f/u repeat TTE    GERD  -discussed importance of daily PPI therapy for prevention of GERD symptoms    Mild normocytic anemia  -stable, appears chronic, consider outpatient workup if persistent or worsening    PPX: Protonix & SCDs    Dispo: pending results of echo and troponins/EKGs

## 2022-05-13 ENCOUNTER — OFFICE VISIT (OUTPATIENT)
Dept: FAMILY MEDICINE | Facility: CLINIC | Age: 67
End: 2022-05-13
Payer: MEDICARE

## 2022-05-13 VITALS
BODY MASS INDEX: 25.28 KG/M2 | DIASTOLIC BLOOD PRESSURE: 82 MMHG | WEIGHT: 176.56 LBS | SYSTOLIC BLOOD PRESSURE: 124 MMHG | HEART RATE: 77 BPM | RESPIRATION RATE: 18 BRPM | HEIGHT: 70 IN | OXYGEN SATURATION: 98 % | TEMPERATURE: 99 F

## 2022-05-13 DIAGNOSIS — J30.2 SEASONAL ALLERGIC RHINITIS, UNSPECIFIED TRIGGER: ICD-10-CM

## 2022-05-13 DIAGNOSIS — K21.9 GASTROESOPHAGEAL REFLUX DISEASE, UNSPECIFIED WHETHER ESOPHAGITIS PRESENT: Primary | ICD-10-CM

## 2022-05-13 PROBLEM — Z86.718 HISTORY OF DVT (DEEP VEIN THROMBOSIS): Status: ACTIVE | Noted: 2022-05-13

## 2022-05-13 PROBLEM — Z95.0 S/P PLACEMENT OF CARDIAC PACEMAKER: Status: ACTIVE | Noted: 2022-05-13

## 2022-05-13 PROBLEM — E78.00 HYPERCHOLESTEREMIA: Status: ACTIVE | Noted: 2022-05-13

## 2022-05-13 PROBLEM — J30.9 ALLERGIC RHINITIS: Status: ACTIVE | Noted: 2022-05-13

## 2022-05-13 PROBLEM — I38 VALVULAR HEART DISEASE: Status: ACTIVE | Noted: 2022-05-13

## 2022-05-13 PROBLEM — I49.5 SICK SINUS SYNDROME: Status: ACTIVE | Noted: 2022-05-13

## 2022-05-13 PROCEDURE — 99213 OFFICE O/P EST LOW 20 MIN: CPT | Mod: S$PBB,,, | Performed by: FAMILY MEDICINE

## 2022-05-13 PROCEDURE — 99213 PR OFFICE/OUTPT VISIT, EST, LEVL III, 20-29 MIN: ICD-10-PCS | Mod: S$PBB,,, | Performed by: FAMILY MEDICINE

## 2022-05-13 PROCEDURE — 99214 OFFICE O/P EST MOD 30 MIN: CPT | Mod: PBBFAC

## 2022-05-13 RX ORDER — FAMOTIDINE 20 MG/1
20 TABLET, FILM COATED ORAL 2 TIMES DAILY PRN
COMMUNITY
Start: 2021-11-30 | End: 2023-02-22

## 2022-05-13 RX ORDER — ICOSAPENT ETHYL 1000 MG/1
2 CAPSULE ORAL 2 TIMES DAILY
COMMUNITY
Start: 2021-09-01 | End: 2023-02-22 | Stop reason: SDUPTHER

## 2022-05-13 RX ORDER — CHOLECALCIFEROL (VITAMIN D3) 125 MCG
125 CAPSULE ORAL DAILY
COMMUNITY
Start: 2021-11-30 | End: 2023-05-23 | Stop reason: ALTCHOICE

## 2022-05-13 RX ORDER — FLUTICASONE PROPIONATE 50 MCG
2 SPRAY, SUSPENSION (ML) NASAL DAILY
COMMUNITY
Start: 2021-09-28 | End: 2022-08-23 | Stop reason: SDUPTHER

## 2022-05-13 NOTE — PROGRESS NOTES
"[place] [place] [facility] [place] [facility] Office Visit Note    CC:   Follow-up (No complaints)     HPI  Rad Reyna is a 66 y.o. male who presents for routine follow up.     Current Visit:   No acute issues. Feels well overall   GERD stable w/ PRN Pepcid.   Seasonal allergies stable w/ Flonase.   Upcoming appt w/ Cardiology 5/24/22    Review of Systems:   Review of Systems   Constitutional: Negative for chills, fever and malaise/fatigue.   HENT: Negative for congestion and sore throat.    Respiratory: Negative for cough and shortness of breath.    Cardiovascular: Negative for chest pain, palpitations and leg swelling.   Gastrointestinal: Negative for abdominal pain, diarrhea, nausea and vomiting.   Musculoskeletal: Negative.    Skin: Negative for itching and rash.   Neurological: Negative for dizziness, focal weakness and headaches.   Psychiatric/Behavioral: Negative for depression. The patient is not nervous/anxious.         Current Outpatient Medications   Medication Instructions    cholecalciferol (vitamin D3) 125 mcg, Oral, Daily    famotidine (PEPCID) 20 mg, Oral, 2 times daily PRN    fluticasone propionate (FLONASE) 50 mcg/actuation nasal spray 2 sprays, Nasal, Daily    icosapent ethyL (VASCEPA) 2 g, Oral, 2 times daily    multivitamin-minerals-lutein Tab 1 tablet, Oral, Daily        Physical Exam:   Vitals:    05/13/22 1443   BP: 124/82   BP Location: Right arm   Patient Position: Sitting   BP Method: Medium (Automatic)   Pulse: 77   Resp: 18   Temp: 98.6 °F (37 °C)   SpO2: 98%   Weight: 80.1 kg (176 lb 9.4 oz)   Height: 5' 9.69" (1.77 m)      Physical Exam   Constitutional: He is oriented to person, place, and time.   Cardiovascular: Normal rate, regular rhythm and normal pulses.   Pulmonary/Chest: Effort normal and breath sounds normal.   Abdominal: Soft. Normal appearance and bowel sounds are normal.   Musculoskeletal:         General: Normal range of motion.   Neurological: He is alert and oriented to person, place, and " time.   Skin: Skin is warm and dry. Capillary refill takes less than 2 seconds.        Assessment/Plan:  1. Gastroesophageal reflux disease, unspecified whether esophagitis present    2. Seasonal allergic rhinitis, unspecified trigger        -stable, doing well  -Continue current medications.   -Discussed Shingrix vaccines at length. Informed refusal at this time. Would like to discuss again at next visit.     Return to clinic in 3 months    Sincere Gaines M.D. [facility]

## 2022-08-02 ENCOUNTER — HOSPITAL ENCOUNTER (EMERGENCY)
Facility: HOSPITAL | Age: 67
Discharge: HOME | End: 2022-08-03
Attending: EMERGENCY MEDICINE
Payer: MEDICARE

## 2022-08-02 VITALS
BODY MASS INDEX: 24.62 KG/M2 | TEMPERATURE: 98 F | DIASTOLIC BLOOD PRESSURE: 95 MMHG | RESPIRATION RATE: 18 BRPM | HEART RATE: 66 BPM | OXYGEN SATURATION: 100 % | WEIGHT: 172 LBS | HEIGHT: 70 IN | SYSTOLIC BLOOD PRESSURE: 133 MMHG

## 2022-08-02 DIAGNOSIS — K29.70 GASTRITIS, PRESENCE OF BLEEDING UNSPECIFIED, UNSPECIFIED CHRONICITY, UNSPECIFIED GASTRITIS TYPE: Primary | ICD-10-CM

## 2022-08-02 DIAGNOSIS — R07.9 ACUTE CHEST PAIN: ICD-10-CM

## 2022-08-02 DIAGNOSIS — R10.13 ABDOMINAL PAIN, ACUTE, EPIGASTRIC: ICD-10-CM

## 2022-08-02 LAB
ALBUMIN SERPL-MCNC: 3.6 GM/DL (ref 3.4–4.8)
ALBUMIN/GLOB SERPL: 1.1 RATIO (ref 1.1–2)
ALP SERPL-CCNC: 60 UNIT/L (ref 40–150)
ALT SERPL-CCNC: 17 UNIT/L (ref 0–55)
APPEARANCE UR: CLEAR
AST SERPL-CCNC: 21 UNIT/L (ref 5–34)
BASOPHILS # BLD AUTO: 0.06 X10(3)/MCL (ref 0–0.2)
BASOPHILS NFR BLD AUTO: 1.2 %
BILIRUB UR QL STRIP.AUTO: NEGATIVE MG/DL
BILIRUBIN DIRECT+TOT PNL SERPL-MCNC: 0.7 MG/DL
BUN SERPL-MCNC: 10.6 MG/DL (ref 8.4–25.7)
CALCIUM SERPL-MCNC: 9 MG/DL (ref 8.8–10)
CHLORIDE SERPL-SCNC: 102 MMOL/L (ref 98–107)
CO2 SERPL-SCNC: 23 MMOL/L (ref 23–31)
COLOR UR AUTO: YELLOW
CREAT SERPL-MCNC: 1.05 MG/DL (ref 0.73–1.18)
EOSINOPHIL # BLD AUTO: 0.03 X10(3)/MCL (ref 0–0.9)
EOSINOPHIL NFR BLD AUTO: 0.6 %
ERYTHROCYTE [DISTWIDTH] IN BLOOD BY AUTOMATED COUNT: 14.3 % (ref 11.5–17)
FLUAV AG UPPER RESP QL IA.RAPID: NOT DETECTED
FLUBV AG UPPER RESP QL IA.RAPID: NOT DETECTED
GLOBULIN SER-MCNC: 3.2 GM/DL (ref 2.4–3.5)
GLUCOSE SERPL-MCNC: 111 MG/DL (ref 82–115)
GLUCOSE UR QL STRIP.AUTO: NEGATIVE MG/DL
HCT VFR BLD AUTO: 39.5 % (ref 42–52)
HGB BLD-MCNC: 12.9 GM/DL (ref 14–18)
IMM GRANULOCYTES # BLD AUTO: 0.01 X10(3)/MCL (ref 0–0.04)
IMM GRANULOCYTES NFR BLD AUTO: 0.2 %
KETONES UR QL STRIP.AUTO: 15 MG/DL
LEUKOCYTE ESTERASE UR QL STRIP.AUTO: ABNORMAL UNIT/L
LIPASE SERPL-CCNC: 20 U/L
LYMPHOCYTES # BLD AUTO: 1.62 X10(3)/MCL (ref 0.6–4.6)
LYMPHOCYTES NFR BLD AUTO: 31.2 %
MCH RBC QN AUTO: 26.9 PG (ref 27–31)
MCHC RBC AUTO-ENTMCNC: 32.7 MG/DL (ref 33–36)
MCV RBC AUTO: 82.5 FL (ref 80–94)
MONOCYTES # BLD AUTO: 0.48 X10(3)/MCL (ref 0.1–1.3)
MONOCYTES NFR BLD AUTO: 9.2 %
NEUTROPHILS # BLD AUTO: 3 X10(3)/MCL (ref 2.1–9.2)
NEUTROPHILS NFR BLD AUTO: 57.6 %
NITRITE UR QL STRIP.AUTO: NEGATIVE
NRBC BLD AUTO-RTO: 0 %
PH UR STRIP.AUTO: 7 [PH]
PLATELET # BLD AUTO: 170 X10(3)/MCL (ref 130–400)
PMV BLD AUTO: 9.4 FL (ref 7.4–10.4)
POTASSIUM SERPL-SCNC: 4.3 MMOL/L (ref 3.5–5.1)
PROT SERPL-MCNC: 6.8 GM/DL (ref 5.8–7.6)
PROT UR QL STRIP.AUTO: NEGATIVE MG/DL
RBC # BLD AUTO: 4.79 X10(6)/MCL (ref 4.7–6.1)
RBC UR QL AUTO: ABNORMAL UNIT/L
SARS-COV-2 RNA RESP QL NAA+PROBE: NOT DETECTED
SODIUM SERPL-SCNC: 136 MMOL/L (ref 136–145)
SP GR UR STRIP.AUTO: 1.02
TROPONIN I SERPL-MCNC: <0.01 NG/ML (ref 0–0.04)
UROBILINOGEN UR STRIP-ACNC: 0.2 MG/DL
WBC # SPEC AUTO: 5.2 X10(3)/MCL (ref 4.5–11.5)

## 2022-08-02 PROCEDURE — 80053 COMPREHEN METABOLIC PANEL: CPT | Performed by: EMERGENCY MEDICINE

## 2022-08-02 PROCEDURE — 96374 THER/PROPH/DIAG INJ IV PUSH: CPT | Mod: 59

## 2022-08-02 PROCEDURE — 36415 COLL VENOUS BLD VENIPUNCTURE: CPT | Performed by: EMERGENCY MEDICINE

## 2022-08-02 PROCEDURE — 63600175 PHARM REV CODE 636 W HCPCS: Performed by: EMERGENCY MEDICINE

## 2022-08-02 PROCEDURE — 83690 ASSAY OF LIPASE: CPT | Performed by: EMERGENCY MEDICINE

## 2022-08-02 PROCEDURE — 84484 ASSAY OF TROPONIN QUANT: CPT | Performed by: EMERGENCY MEDICINE

## 2022-08-02 PROCEDURE — 85025 COMPLETE CBC W/AUTO DIFF WBC: CPT | Performed by: EMERGENCY MEDICINE

## 2022-08-02 PROCEDURE — 81001 URINALYSIS AUTO W/SCOPE: CPT | Performed by: EMERGENCY MEDICINE

## 2022-08-02 PROCEDURE — 99285 EMERGENCY DEPT VISIT HI MDM: CPT | Mod: 25

## 2022-08-02 PROCEDURE — 87636 SARSCOV2 & INF A&B AMP PRB: CPT | Performed by: EMERGENCY MEDICINE

## 2022-08-02 RX ORDER — ONDANSETRON 2 MG/ML
4 INJECTION INTRAMUSCULAR; INTRAVENOUS
Status: COMPLETED | OUTPATIENT
Start: 2022-08-02 | End: 2022-08-02

## 2022-08-02 RX ADMIN — IOPAMIDOL 100 ML: 755 INJECTION, SOLUTION INTRAVENOUS at 11:08

## 2022-08-02 RX ADMIN — ONDANSETRON 4 MG: 2 INJECTION INTRAMUSCULAR; INTRAVENOUS at 09:08

## 2022-08-02 NOTE — Clinical Note
"Rad"Arthur Reyna was seen and treated in our emergency department on 8/2/2022.  He may return to work on 08/05/2022.       If you have any questions or concerns, please don't hesitate to call.      Shandra Cummings MD"

## 2022-08-03 LAB
BACTERIA #/AREA URNS AUTO: NORMAL /HPF
RBC #/AREA URNS AUTO: NORMAL /HPF
SQUAMOUS #/AREA URNS AUTO: NORMAL /HPF
TROPONIN I SERPL-MCNC: <0.01 NG/ML (ref 0–0.04)
WBC #/AREA URNS AUTO: NORMAL /HPF

## 2022-08-03 PROCEDURE — 36415 COLL VENOUS BLD VENIPUNCTURE: CPT | Performed by: EMERGENCY MEDICINE

## 2022-08-03 PROCEDURE — 25500020 PHARM REV CODE 255: Performed by: EMERGENCY MEDICINE

## 2022-08-03 PROCEDURE — 84484 ASSAY OF TROPONIN QUANT: CPT | Performed by: EMERGENCY MEDICINE

## 2022-08-03 NOTE — ED PROVIDER NOTES
Encounter Date: 8/2/2022       History     Chief Complaint   Patient presents with    Abdominal Pain    Chest Pain     66-year-old male with a history of sick sinus syndrome, pacemaker in place, states that 2 days ago he had epigastric pain with nausea and vomiting and went to [facility].  He was given a GI cocktail and prescribed Protonix and zofran and his pain had resolved until today.  He now has epigastric and chest pain radiating into his back associated with nausea and vomiting.  No shortness of breath.  He does have intermittent cough.  No sore throat, runny nose, or ear pain.  No fever. The pain is almost completely gone but he is still nauseated.  He has not taken his Zofran.        Review of patient's allergies indicates:   Allergen Reactions    Penicillins      Other reaction(s): rash  Other reaction(s): rash      Nsaids (non-steroidal anti-inflammatory drug) Nausea Only     No past medical history on file.  No past surgical history on file.  No family history on file.  Social History     Tobacco Use    Smoking status: Never Smoker    Smokeless tobacco: Never Used     Review of Systems   Cardiovascular: Positive for chest pain.   Gastrointestinal: Positive for abdominal pain, nausea and vomiting.   Musculoskeletal: Positive for back pain.   All other systems reviewed and are negative.      Physical Exam     Initial Vitals [08/02/22 2123]   BP Pulse Resp Temp SpO2   (!) 133/95 66 18 98.4 °F (36.9 °C) 100 %      MAP       --         Physical Exam    Nursing note and vitals reviewed.  Constitutional: Vital signs are normal. He appears well-developed and well-nourished.   HENT:   Head: Normocephalic and atraumatic.   Eyes: Pupils are equal, round, and reactive to light.   Neck: Neck supple.   Cardiovascular: Normal rate, regular rhythm and normal heart sounds.   Pulmonary/Chest: Breath sounds normal. No respiratory distress.   Abdominal: Abdomen is soft. He exhibits no distension and no mass.  There is no abdominal tenderness. There is no rebound and no guarding.   Musculoskeletal:         General: No tenderness or edema.      Cervical back: Neck supple. No edema or erythema.     Lymphadenopathy:     He has no cervical adenopathy.   Neurological: He is alert.   Skin: Skin is warm and dry. Capillary refill takes less than 2 seconds.         ED Course   Procedures  Labs Reviewed   CBC WITH DIFFERENTIAL - Abnormal; Notable for the following components:       Result Value    Hgb 12.9 (*)     Hct 39.5 (*)     MCH 26.9 (*)     MCHC 32.7 (*)     All other components within normal limits   URINALYSIS, REFLEX TO URINE CULTURE - Abnormal; Notable for the following components:    Ketones, UA 15  (*)     Blood, UA Small (*)     Leukocyte Esterase, UA Trace (*)     All other components within normal limits   LIPASE - Normal   COVID/FLU A&B PCR - Normal   TROPONIN I - Normal   URINALYSIS, MICROSCOPIC - Normal   TROPONIN I - Normal   COMPREHENSIVE METABOLIC PANEL     EKG Readings: (Independently Interpreted)   Initial Reading: No STEMI. Rhythm: Paced Rhythm. Heart Rate: 63. Ectopy: No Ectopy. Conduction: Normal. ST Segments: Normal ST Segments. T Waves: Normal. Clinical Impression: Paced Rhythm       Imaging Results          CT Abdomen Pelvis With Contrast (Preliminary result)  Result time 08/02/22 23:53:42    Preliminary result by Interface, Rad Results In (08/02/22 23:53:42)                 Narrative:    START OF REPORT:  Technique: CT of the abdomen and pelvis was performed with axial images as well as sagittal and coronal reconstruction images with intravenous contrast but without oral contrast.    Comparison: None available.    Clinical History: Epigastric pain after eating red beans and rice tonight.    Dosage Information: Automated Exposure Control was utilized.    Findings:  Technical notes: Please note the absence of oral contrast significantly decreases sensitivity and specificity of the study for  gastrointestinal pathology.  Lines and Tubes: None.  Thorax:  Lungs: The visualized lung bases appear unremarkable.  Pleura: No effusions or thickening. No pneumothorax is seen in the visualized lung bases.  Heart: The heart appears somewhat prominent. Pacer leads are seen in the visualized heart.  Abdomen:  Abdominal Wall: No abdominal wall pathology is seen.  Liver: The liver appears unremarkable.  Biliary System: No intrahepatic or extrahepatic biliary duct dilatation is seen.  Gallbladder: The gallbladder appears unremarkable with no stones wall thickening or pericholecystic inflammatory changes or fluid.  Pancreas: The pancreas appears unremarkable.  Spleen: The spleen appears unremarkable.  Adrenals: The adrenal glands appear unremarkable.  Kidneys: The left kidney appears unremarkable with no stones cysts masses or hydronephrosis. A single cyst measuring 7.9 mm is seen on Image 29, series 4 in the lower pole of right kidney. Otherwise, the right kidney appears unremarkable.  Aorta: The abdominal aorta appears unremarkable. There is mild calcification of the abdominal aorta.  IVC: Unremarkable.  Bowel:  Esophagus: The visualized esophagus appears unremarkable.  Stomach: The wall of gastric body and antrum look thick suggesting gastritis.  Duodenum: Unremarkable appearing duodenum.  Small Bowel: The small bowel appears unremarkable.  Colon: Nondistended. A few scattered diverticula are seen predominantly in the descending colon. No associated inflammatory stranding or pericolonic fluid is seen to suggest diverticulitis.  Appendix: No appendix is identified and surgical clips are and a suture line is seen at the base of the cecum consistent with history of appendectomy.  Peritoneum: No intraperitoneal free air or ascites is seen.    Pelvis:  Bladder: The bladder appears unremarkable.  Male:  Prostate gland: The prostate gland appears unremarkable.    Bony structures:  Dorsal Spine: There is moderate pronounced  multilevel spondylosis of the visualized dorsal spine.  Bony Pelvis: The visualized bony structures of the pelvis appear unremarkable.      Impression:  1. The wall of gastric body and antrum look thick suggesting gastritis.  2. A few scattered diverticula are seen predominantly in the descending colon. No associated inflammatory stranding or pericolonic fluid is seen to suggest diverticulitis.  3. Details and other findings as discussed above.                      Preliminary result by Jordan Hogue Jr., MD (08/02/22 23:53:42)                 Narrative:    START OF REPORT:  Technique: CT of the abdomen and pelvis was performed with axial images as well as sagittal and coronal reconstruction images with intravenous contrast but without oral contrast.    Comparison: None available.    Clinical History: Epigastric pain after eating red beans and rice tonight.    Dosage Information: Automated Exposure Control was utilized.    Findings:  Technical notes: Please note the absence of oral contrast significantly decreases sensitivity and specificity of the study for gastrointestinal pathology.  Lines and Tubes: None.  Thorax:  Lungs: The visualized lung bases appear unremarkable.  Pleura: No effusions or thickening. No pneumothorax is seen in the visualized lung bases.  Heart: The heart appears somewhat prominent. Pacer leads are seen in the visualized heart.  Abdomen:  Abdominal Wall: No abdominal wall pathology is seen.  Liver: The liver appears unremarkable.  Biliary System: No intrahepatic or extrahepatic biliary duct dilatation is seen.  Gallbladder: The gallbladder appears unremarkable with no stones wall thickening or pericholecystic inflammatory changes or fluid.  Pancreas: The pancreas appears unremarkable.  Spleen: The spleen appears unremarkable.  Adrenals: The adrenal glands appear unremarkable.  Kidneys: The left kidney appears unremarkable with no stones cysts masses or hydronephrosis. A single cyst  measuring 7.9 mm is seen on Image 29, series 4 in the lower pole of right kidney. Otherwise, the right kidney appears unremarkable.  Aorta: The abdominal aorta appears unremarkable. There is mild calcification of the abdominal aorta.  IVC: Unremarkable.  Bowel:  Esophagus: The visualized esophagus appears unremarkable.  Stomach: The wall of gastric body and antrum look thick suggesting gastritis.  Duodenum: Unremarkable appearing duodenum.  Small Bowel: The small bowel appears unremarkable.  Colon: Nondistended. A few scattered diverticula are seen predominantly in the descending colon. No associated inflammatory stranding or pericolonic fluid is seen to suggest diverticulitis.  Appendix: No appendix is identified and surgical clips are and a suture line is seen at the base of the cecum consistent with history of appendectomy.  Peritoneum: No intraperitoneal free air or ascites is seen.    Pelvis:  Bladder: The bladder appears unremarkable.  Male:  Prostate gland: The prostate gland appears unremarkable.    Bony structures:  Dorsal Spine: There is moderate pronounced multilevel spondylosis of the visualized dorsal spine.  Bony Pelvis: The visualized bony structures of the pelvis appear unremarkable.      Impression:  1. The wall of gastric body and antrum look thick suggesting gastritis.  2. A few scattered diverticula are seen predominantly in the descending colon. No associated inflammatory stranding or pericolonic fluid is seen to suggest diverticulitis.  3. Details and other findings as discussed above.                                 X-Ray Chest 1 View (In process)                  Medications   ondansetron injection 4 mg (4 mg Intravenous Given 8/2/22 8694)   iopamidoL (ISOVUE-370) injection 100 mL (100 mLs Intravenous Given 8/2/22 9569)     Medical Decision Making:   Initial Assessment:   66-year-old male with a history of sick sinus syndrome status post pacemaker placement complains of epigastric pain,  nausea, vomiting with the pain radiating into the left chest and into his back.  Differential Diagnosis:   Gastritis, cholecystitis, ACS, gastroenteritis  ED Management:  Patient says pain was almost completely resolved by the time he got here so I just gave him a dose of Zofran and then his pain and nausea resolved.  He had this episode on Sunday, 2 days ago when he was seen at [facility].  He also reports a similar episode 6 months ago and also similar episodes in the past 3 years.  CT scan is concerning for gastritis in his symptoms are most consistent with gastritis considering he has a normal EKG and has negative troponins x2 3 hours apart.  I recommend that he follow up with his PCP and also with his cardiologist.  At discharge he is pain free, nausea resolved, feeling well.  All questions were answered and reasons to return to the emergency room was discussed.             ED Course as of 08/03/22 0618   Tue Aug 02, 2022   2326 Patient is currently pain-free and his nausea has resolved we will repeat the troponin at the 3 hour neil.  However, his family is very concerned stating that he had some type of complicated abdominal surgery at [facility] years ago and also stating that this is and recurrent problem with him having pain like this.  For that reason I will do a CT abdomen and pelvis for further evaluation. [SH]      ED Course User Index  [SH] Shandra Cummings MD             Clinical Impression:   Final diagnoses:  [K29.70] Gastritis, presence of bleeding unspecified, unspecified chronicity, unspecified gastritis type (Primary)  [R10.13] Abdominal pain, acute, epigastric  [R07.9] Acute chest pain          ED Disposition Condition    Discharge Stable        ED Prescriptions     None        Follow-up Information     Follow up With Specialties Details Why Contact Info    David Milan MD Family Medicine Schedule an appointment as soon as possible for a visit in 3 days  [address] [facility]  [address]  [phone]             Shandra Cummings MD  08/03/22 0618

## 2022-08-23 ENCOUNTER — OFFICE VISIT (OUTPATIENT)
Dept: FAMILY MEDICINE | Facility: CLINIC | Age: 67
End: 2022-08-23
Payer: MEDICARE

## 2022-08-23 VITALS
HEART RATE: 69 BPM | OXYGEN SATURATION: 100 % | DIASTOLIC BLOOD PRESSURE: 73 MMHG | HEIGHT: 70 IN | BODY MASS INDEX: 25 KG/M2 | TEMPERATURE: 99 F | SYSTOLIC BLOOD PRESSURE: 113 MMHG | WEIGHT: 174.63 LBS

## 2022-08-23 DIAGNOSIS — J30.1 SEASONAL ALLERGIC RHINITIS DUE TO POLLEN: Primary | ICD-10-CM

## 2022-08-23 DIAGNOSIS — K21.9 GASTROESOPHAGEAL REFLUX DISEASE WITHOUT ESOPHAGITIS: ICD-10-CM

## 2022-08-23 PROCEDURE — 99213 OFFICE O/P EST LOW 20 MIN: CPT | Mod: PBBFAC

## 2022-08-23 RX ORDER — FLUTICASONE PROPIONATE 50 MCG
2 SPRAY, SUSPENSION (ML) NASAL DAILY
Qty: 18.2 ML | Refills: 2 | Status: SHIPPED | OUTPATIENT
Start: 2022-08-23 | End: 2023-08-23

## 2022-08-23 RX ORDER — PANTOPRAZOLE SODIUM 40 MG/1
40 TABLET, DELAYED RELEASE ORAL DAILY
COMMUNITY
Start: 2022-07-31 | End: 2023-08-24 | Stop reason: ALTCHOICE

## 2022-08-23 NOTE — PROGRESS NOTES
[facility] Progress Note    ID:  Rad Reyna   MRN:  [id number]     8/23/2022    Chief Complaint:      History of Present Illness:  Rad Reyna is a 67 y.o. male who presents to [facility] clinic for     Problem #1: ED visit follow up  - GERD, Gastritis 8/2/22  - Nausea and Vomitting at that time  - CT AP showed gastritis  - Started on protonix 40 daily, symptomatic improvement    Problem #2: Seasonal Allergies  - 2 day history of rhinorrhea, postnasal drip, sinus congestion, cough  - previously using Flonase without of current prescription  - requesting refill, not interested in daily oral antihistamines  - no previously allergy testing    Immunization due:   Shingrix, defers today    Health Maintenance   Topic Date Due    Hepatitis C Screening  Never done    TETANUS VACCINE  Never done    Lipid Panel  05/21/2026       Social History     Tobacco Use    Smoking status: Never Smoker    Smokeless tobacco: Never Used         Current Outpatient Medications:     pantoprazole (PROTONIX) 40 MG tablet, Take 40 mg by mouth once daily at 6am., Disp: , Rfl:     cholecalciferol, vitamin D3, 125 mcg (5,000 unit) capsule, Take 125 mcg by mouth once daily., Disp: , Rfl:     famotidine (PEPCID) 20 MG tablet, Take 20 mg by mouth 2 (two) times daily as needed for Heartburn., Disp: , Rfl:     fluticasone propionate (FLONASE) 50 mcg/actuation nasal spray, 2 sprays (100 mcg total) by Each Nostril route once daily., Disp: 18.2 mL, Rfl: 2    icosapent ethyL (VASCEPA) 1 gram Cap, Take 2 g by mouth 2 (two) times daily., Disp: , Rfl:     multivitamin-minerals-lutein Tab, Take 1 tablet by mouth once daily., Disp: , Rfl:     Review of patient's allergies indicates:   Allergen Reactions    Penicillins      Other reaction(s): rash  Other reaction(s): rash      Nsaids (non-steroidal anti-inflammatory drug) Nausea Only         Review of Systems:  Positive-see above  Negative- fever, chills, weight loss, headaches, dizziness,  "vision changes, chest pain, palpitations, shortness of breath, wheezing, abdominal pain, nausea, vomiting, diarrhea, constipation, dysuria, lower extremity edema      Physical Exam:  /73 (BP Location: Left arm, Patient Position: Sitting, BP Method: Medium (Automatic))   Pulse 69   Temp 98.6 °F (37 °C) (Oral)   Ht 5' 10" (1.778 m)   Wt 79.2 kg (174 lb 9.7 oz)   SpO2 100%   BMI 25.05 kg/m²     Vitals reviewed, within normal limits     General-well appearing elderly  male, sitting comfortably in exam chair no acute distress   HEENT- PERRLA, EOM intact, muddy brown sclera.  Nares patent without rhinorrhea.  Oropharynx mildly erythematous, upper dentures in place, multiple missing lower teeth.  Right-sided submandibular nontender lymphadenopathy  CV-regular rate and rhythm, no lower extremity edema   Lungs-breathing nonlabored, lungs CTA   GI-minimal epigastric tenderness, abdomen soft nondistended    Lab Results   Component Value Date     08/02/2022    K 4.3 08/02/2022    CO2 23 08/02/2022    BUN 10.6 08/02/2022    AST 21 08/02/2022    ALT 17 08/02/2022    TRIG 70 05/21/2021    CHOL 191 05/21/2021    HDL 54 05/21/2021       Assessment/Plan:    Problem #1:  ED follow-up/gastritis   - well controlled  -medications reviewed  -continue Protonix 40 mg daily    Problem #2:  Allergic rhinitis   - poor control   - prescription for Flonase nasal spray sent to pharmacy  - discussed p.o. antihistamine therapy, denies at this time   - follow-up as needed if symptoms worsen  - consider now allergy testing, counseled on hypoallergenic household    David Milan MD  [employer] FM Resident HO[facility]    Follow up in 3 months for health maintenance      "

## 2022-09-13 ENCOUNTER — CLINICAL SUPPORT (OUTPATIENT)
Dept: CARDIOLOGY | Facility: CLINIC | Age: 67
End: 2022-09-13
Payer: MEDICARE

## 2022-09-13 DIAGNOSIS — I49.5 SICK SINUS SYNDROME: Primary | ICD-10-CM

## 2022-09-13 PROCEDURE — 93280 PM DEVICE PROGR EVAL DUAL: CPT | Mod: PBBFAC

## 2022-09-13 PROCEDURE — 99211 OFF/OP EST MAY X REQ PHY/QHP: CPT | Mod: PBBFAC

## 2022-10-17 ENCOUNTER — OFFICE VISIT (OUTPATIENT)
Dept: CARDIOLOGY | Facility: CLINIC | Age: 67
End: 2022-10-17
Payer: MEDICARE

## 2022-10-17 VITALS
HEART RATE: 76 BPM | DIASTOLIC BLOOD PRESSURE: 90 MMHG | OXYGEN SATURATION: 98 % | SYSTOLIC BLOOD PRESSURE: 122 MMHG | RESPIRATION RATE: 20 BRPM | HEIGHT: 70 IN | WEIGHT: 171.31 LBS | TEMPERATURE: 98 F | BODY MASS INDEX: 24.52 KG/M2

## 2022-10-17 DIAGNOSIS — E78.00 HYPERCHOLESTEREMIA: ICD-10-CM

## 2022-10-17 DIAGNOSIS — I49.5 SICK SINUS SYNDROME: ICD-10-CM

## 2022-10-17 DIAGNOSIS — I35.1 MILD AORTIC REGURGITATION: ICD-10-CM

## 2022-10-17 DIAGNOSIS — E78.5 HYPERLIPIDEMIA, UNSPECIFIED HYPERLIPIDEMIA TYPE: Primary | ICD-10-CM

## 2022-10-17 DIAGNOSIS — I34.0 MILD MITRAL REGURGITATION BY PRIOR ECHOCARDIOGRAM: ICD-10-CM

## 2022-10-17 DIAGNOSIS — Z95.0 S/P PLACEMENT OF CARDIAC PACEMAKER: ICD-10-CM

## 2022-10-17 DIAGNOSIS — Z86.718 HISTORY OF DVT (DEEP VEIN THROMBOSIS): ICD-10-CM

## 2022-10-17 PROCEDURE — 99213 OFFICE O/P EST LOW 20 MIN: CPT | Mod: PBBFAC | Performed by: INTERNAL MEDICINE

## 2022-10-17 NOTE — PROGRESS NOTES
Chief Complaint   Patient presents with    f/u denies chest pain or sob          This is a 67 year old AAM with  sick sinus syndrome s/p Medtronic dual chamber PPM in 7/20201, mild AI, mild MR and hypercholesteremia who presents for routine follow up and ongoing care. Latest echocardiogram completed on 4.14.21 revealed a left ventricular ejection fraction that measured approximately 55 to 60%. Post procedure the patient noted left arm swelling. He had a subsequent ultrasound on 8.3.21 that revealed evidence of DVT involving the left IJV, subclavian and axillary vein. He was subsequently initiated on Xarelto. Pt completed a 6 month course of anticoagulation.  Today patient returns for follow-up.  He has been feeling well.  He is very active.  He rides his bike for 24 miles twice a week.  He also washes automobiles. No exertional chest pain, shortness of breath, fatigue.  Patient also denies orthopnea, PND, lower extremity edema, palpitations, dizziness, lightheadedness or syncope.          TTE 4.14.21  Mild to moderately dilated left ventricle  Left ventricular ejection fraction is measured approximately 55 to 60%  Mild late systolic prolapse of mitral valve  Mild mitral regurgitation  Structurally aortic valve is trileaflet  Mild aortic regurgitation present  Tricuspid valve is structurally normal  There is trace tricuspid regurgitation with RVSP estimated at 38 mmHg  Pulmonic valve is structurally normal  Trace pulmonic regurgitation present  Mildly dilated right atrium  Right ventricle global systolic function is hyperdynamic  Mildly enlarged right ventricle cavity    48-hour Holter monitor June 2020:  The patient was in sinus bradycardia with an average heart rate of 48 bpm. Heart rates less than 50 bpm were noted 82% of the time.  Supraventricular ectopic beats consisted of occasional premature beats. Longest SVT run was 12 beats long and at heart rate of 108 bpm. Fastest SVT run was 6 beats long and at heart  rate 158 bpm.  Ventricular ectopic beats consisted of occasional unifocal beats with episodes of couplets and bigeminy.  2 pauses exceeding 2.0 seconds. The longest pause was 2.5 seconds.  No diary was returned.  Findings are consistent with sick sinus syndrome. Consider permanent pacemaker if clinically indicated.    1.31.18 Treadmill testing  Negative for ischemia, satisfactory exercise tolerance, no chest pain, and frequent isolated PVCs and bigeminy.     Review of Systems   Constitutional: negative for fever,chills, sweats, weakness, fatigue, decreased activity   Eye: negative for blurry vision, vision change  ENMT: negative for sore throat, nasal congestion  Respiratory: negative for shortness of breath, cough, wheezing  Cardiovascular: negative for chest pain, dyspnea on exertion, orthopnea, PND, lower extremity edema, palpitations, claudication  Gastrointestinal: negative for nausea, vomiting, abdominal pain, constipation, diarrhea, blood in stool  Genitourinary: negative for hematuria, nocturia, dysuria  Endocrine: negative for abnormal thirst, temperature  Musculoskeletal: negative for back pain, joint pain  Integumentary: negative for abnormal mole  Neurologic: negative for weakness, numbness, headaches, shooting pain  Hematology: negative for easy bruising, easy bleeding  Allergy: negative for watery eyes, sneezing  Psychiatric: negative for loss of interest, anxiety, stress      Physical Exam Vitals & Measurements   Vitals:    10/17/22 0940   BP: (!) 122/90   Pulse: 76   Resp: 20   Temp: 98.2 °F (36.8 °C)       General: alert and oriented/no acute distress  Eye: EOMI/normal conjunctiva  HENT: normocephalic/moist oral mucosa  Neck: supple/nontender/no carotid bruit/no JVD  Respiratory: lungs CTA/nonlabored respirations/BS equal/symmetrical expansion/no chest wall tenderness  Cardiovascular: normal rate/normal rhythm/no murmur/normal peripheral perfusion/no edema  Gastrointestinal:  soft/nontender/nondistended  Musculoskeletal: normal ROM/normal strength  Integumentary: warm/dry/pink/intact  Neurologic: alert/oriented/normal sensory/no focal deficits  Psychiatric: cooperative/appropriate mood and affect/normal judgment       Current Outpatient Medications:     cholecalciferol, vitamin D3, 125 mcg (5,000 unit) capsule, Take 125 mcg by mouth once daily., Disp: , Rfl:     famotidine (PEPCID) 20 MG tablet, Take 20 mg by mouth 2 (two) times daily as needed for Heartburn., Disp: , Rfl:     fluticasone propionate (FLONASE) 50 mcg/actuation nasal spray, 2 sprays (100 mcg total) by Each Nostril route once daily., Disp: 18.2 mL, Rfl: 2    icosapent ethyL (VASCEPA) 1 gram Cap, Take 2 g by mouth 2 (two) times daily., Disp: , Rfl:     multivitamin-minerals-lutein Tab, Take 1 tablet by mouth once daily., Disp: , Rfl:     pantoprazole (PROTONIX) 40 MG tablet, Take 40 mg by mouth once daily at 6am., Disp: , Rfl:         Assessment/Plan   Sick Sinus Syndrome  - s/p dual-chamber pacemaker implantation on 7.21.21 ([employer]tronic)  Last device interrogation 9/2022 showed 93% A pacing, no V pacing. Episodes of AHR noted with HR about 150 bpm, longest lasting 2 minutes. Not suggestive of Afib.   - Continue device interrogation q6 months     Valvular Heart Disease  - EF 55 to 60%, mild mitral regurgitation, mild aortic regurgitation, trace tricuspid regurgitation with RVSP 38 and trace pulmonic regurgitation. (TTE 4.14.21)  - Moderate MR -stable (previously noted on TTE 9/2017), Trace TR, mild AR, (TTE 3.11.20)  - pt will need serial echocardiograms q3-5 years  - Denies SOB or RAO     Hypercholesterolemia  - Cholesterol 191, HDL 54, triglycerides 70, , VLDL 14 (lipid panel 5.21.21)  10 year ASCVD risk is 8.8% with optimal risk 7.3%  As optimal risk not significantly lower than current risk, can hold off and recalculate ASCVD in the future with repeat FLP  Continue vascepa per PCP      DVT of LUE  - DVT of LUE ( US  - 8.3.21), post Ppm implantation  Completed 6 months of xarelto.    FLP  Continue device check  Follow up in Cardiology clinic in 6 months  Follow up with PCP as directed

## 2022-11-21 ENCOUNTER — OFFICE VISIT (OUTPATIENT)
Dept: FAMILY MEDICINE | Facility: CLINIC | Age: 67
End: 2022-11-21
Payer: MEDICARE

## 2022-11-21 VITALS
BODY MASS INDEX: 25 KG/M2 | OXYGEN SATURATION: 100 % | DIASTOLIC BLOOD PRESSURE: 77 MMHG | SYSTOLIC BLOOD PRESSURE: 110 MMHG | WEIGHT: 174.63 LBS | RESPIRATION RATE: 18 BRPM | HEART RATE: 71 BPM

## 2022-11-21 DIAGNOSIS — J30.2 SEASONAL ALLERGIC RHINITIS, UNSPECIFIED TRIGGER: Primary | ICD-10-CM

## 2022-11-21 DIAGNOSIS — Z12.5 PROSTATE CANCER SCREENING: ICD-10-CM

## 2022-11-21 DIAGNOSIS — Z23 IMMUNIZATION DUE: ICD-10-CM

## 2022-11-21 DIAGNOSIS — E78.00 HYPERCHOLESTEREMIA: ICD-10-CM

## 2022-11-21 PROCEDURE — 99213 OFFICE O/P EST LOW 20 MIN: CPT | Mod: PBBFAC,25

## 2022-11-21 PROCEDURE — G0008 ADMIN INFLUENZA VIRUS VAC: HCPCS | Mod: PBBFAC

## 2022-11-21 NOTE — PROGRESS NOTES
[facility] FM Clinic Progress Note    ID:  Rad Reyna   MRN:  [id number]     11/21/2022    Chief Complaint:      History of Present Illness:  Rad Reyna is a 67 y.o. male who presents to [facility] FM clinic for     Problem #1: ED visit follow up  - GERD, Gastritis 8/2/22  - Nausea and Vomitting at that time  - CT AP showed gastritis  - Started on protonix 40 daily, symptomatic improvement    Problem #2: Seasonal Allergies  - 2 day history of rhinorrhea, postnasal drip, sinus congestion, cough  - previously using Flonase without of current prescription  - requesting refill, not interested in daily oral antihistamines  - no previously allergy testing    Immunization due:   Shingrix, defers today    Health Maintenance   Topic Date Due    Hepatitis C Screening  Never done    Lipid Panel  05/21/2026    TETANUS VACCINE  05/30/2027       Social History     Tobacco Use    Smoking status: Never    Smokeless tobacco: Never   Substance Use Topics    Alcohol use: Not Currently    Drug use: Not Currently         Current Outpatient Medications:     cholecalciferol, vitamin D3, 125 mcg (5,000 unit) capsule, Take 125 mcg by mouth once daily., Disp: , Rfl:     famotidine (PEPCID) 20 MG tablet, Take 20 mg by mouth 2 (two) times daily as needed for Heartburn., Disp: , Rfl:     fluticasone propionate (FLONASE) 50 mcg/actuation nasal spray, 2 sprays (100 mcg total) by Each Nostril route once daily., Disp: 18.2 mL, Rfl: 2    icosapent ethyL (VASCEPA) 1 gram Cap, Take 2 g by mouth 2 (two) times daily., Disp: , Rfl:     multivitamin-minerals-lutein Tab, Take 1 tablet by mouth once daily., Disp: , Rfl:     pantoprazole (PROTONIX) 40 MG tablet, Take 40 mg by mouth once daily at 6am., Disp: , Rfl:     Review of patient's allergies indicates:   Allergen Reactions    Penicillins      Other reaction(s): rash  Other reaction(s): rash      Nsaids (non-steroidal anti-inflammatory drug) Nausea Only         Review of Systems:  Positive-see  above  Negative- fever, chills, weight loss, headaches, dizziness, vision changes, chest pain, palpitations, shortness of breath, wheezing, abdominal pain, nausea, vomiting, diarrhea, constipation, dysuria, lower extremity edema      Physical Exam:  /77   Pulse 71   Resp 18   Wt 79.2 kg (174 lb 9.6 oz)   SpO2 100%   BMI 25.00 kg/m²     Vitals reviewed, within normal limits     General-well appearing elderly  male, sitting comfortably in exam chair no acute distress   HEENT- PERRLA, EOM intact, muddy brown sclera.  Nares patent without rhinorrhea.  Oropharynx mildly erythematous, upper dentures in place, multiple missing lower teeth.  Right-sided submandibular nontender lymphadenopathy  CV-regular rate and rhythm, no lower extremity edema   Lungs-breathing nonlabored, lungs CTA   GI-minimal epigastric tenderness, abdomen soft nondistended    Assessment/Plan:    Gastritis   - Pepcid PRN    Allergic rhinitis   - Controlled  - prescription for Flonase nasal spray sent to pharmacy  - discussed p.o. antihistamine therapy, denies at this time   - follow-up as needed if symptoms worsen  - consider now allergy testing, counseled on hypoallergenic household    David Milan MD  [employer] FM Resident HO-2    Follow up in 3 months for health maintenance

## 2022-11-21 NOTE — PROGRESS NOTES
[facility] FM Clinic Progress Note    ID:  Rad Reyna   MRN:  [id number]     11/21/2022    Chief Complaint:  routine follow up    History of Present Illness:  Rad Reyna is a 67 y.o. male who presents to [facility] FM clinic for     GERD  - stable, improved  - Taking OTC pepcid PRN    Seasonal Allergies  - improved, med compliant with daily flonase, no additional OTC meds  - Denies rhinorrhea, congestion, cough, itchy eyes    HLD  - med compliant with Vescepa per cardiology  - Fasting lipid scheduled for future    Sick Sinus Syndrome  Mild Aortic/Mitral regurge  Biventricular pacemaker  - stable  - Continue to follow with cards    HM:  PSA ordered, last was 0.9 2021  Shingrix at future visit  Flu today  [facility]      Health Maintenance   Topic Date Due    Hepatitis C Screening  Never done    Lipid Panel  05/21/2026    TETANUS VACCINE  05/30/2027       Social History     Tobacco Use    Smoking status: Never    Smokeless tobacco: Never   Substance Use Topics    Alcohol use: Not Currently    Drug use: Not Currently         Current Outpatient Medications:     cholecalciferol, vitamin D3, 125 mcg (5,000 unit) capsule, Take 125 mcg by mouth once daily., Disp: , Rfl:     famotidine (PEPCID) 20 MG tablet, Take 20 mg by mouth 2 (two) times daily as needed for Heartburn., Disp: , Rfl:     fluticasone propionate (FLONASE) 50 mcg/actuation nasal spray, 2 sprays (100 mcg total) by Each Nostril route once daily., Disp: 18.2 mL, Rfl: 2    icosapent ethyL (VASCEPA) 1 gram Cap, Take 2 g by mouth 2 (two) times daily., Disp: , Rfl:     multivitamin-minerals-lutein Tab, Take 1 tablet by mouth once daily., Disp: , Rfl:     pantoprazole (PROTONIX) 40 MG tablet, Take 40 mg by mouth once daily at 6am., Disp: , Rfl:     Review of patient's allergies indicates:   Allergen Reactions    Penicillins      Other reaction(s): rash  Other reaction(s): rash      Nsaids (non-steroidal anti-inflammatory drug) Nausea Only         Review of  Systems:  Positive-see above  Negative- fever, chills, weight loss, headaches, dizziness, vision changes, chest pain, palpitations, shortness of breath, wheezing, abdominal pain, nausea, vomiting, diarrhea, constipation, dysuria, lower extremity edema      Physical Exam:  /77   Pulse 71   Resp 18   Wt 79.2 kg (174 lb 9.6 oz)   SpO2 100%   BMI 25.00 kg/m²     Vitals reviewed, within normal limits     General-well appearing elderly  male, sitting comfortably in exam chair no acute distress   HEENT- PERRLA, EOM intact, muddy brown sclera.  Nares patent without rhinorrhea.  Oropharynx mildly erythematous, upper dentures in place, multiple missing lower teeth.  Right-sided submandibular nontender lymphadenopathy  CV-regular rate and rhythm, no lower extremity edema   Lungs-breathing nonlabored, lungs CTA   GI-minimal epigastric tenderness, abdomen soft nondistended      Assessment/Plan:    Gastritis   - well controlled  -medications reviewed  -continue regimen    Allergic rhinitis   - Well controlled  - Cont Flonase nasal spray    HLD  - Fasting lipid pending  - Vescepa per cards    HM  FLU today  PSA ordered    David Milan MD  [facility] FM Resident HO-2    Follow up 3 months for Immunization

## 2022-11-24 NOTE — PROGRESS NOTES
I reviewed History, PE, A/P and medical record.  Services provided in a teaching facility, I was immediately available.  I agree with resident, care reasonable and necessary.   I evaluated the patient with resident at time of visit, participated in key parts of H/P and management was discussed.    Doing great overall   No c/o    Chart review  Old entry A fib - I cannot confirm this in [facility] records, has Medtronic pacemaker placed 7/2021  Completed Xarelto 8/2022 for LUE DVT    Tried it seems Pravachol, Lipitor in past, may consider retrial on statin with Co q 10 - [facility]  Last colonoscopy was at [facility] per chart  -report not available, FIT neg 2/2022    Care Team  [facility] CARD    Shobha Esquivel MD  [facility] - [facility]

## 2022-12-05 ENCOUNTER — LAB VISIT (OUTPATIENT)
Dept: LAB | Facility: HOSPITAL | Age: 67
End: 2022-12-05
Attending: STUDENT IN AN ORGANIZED HEALTH CARE EDUCATION/TRAINING PROGRAM
Payer: MEDICARE

## 2022-12-05 DIAGNOSIS — Z12.5 PROSTATE CANCER SCREENING: ICD-10-CM

## 2022-12-05 LAB — PSA SERPL-MCNC: 0.88 NG/ML

## 2022-12-05 PROCEDURE — 84153 ASSAY OF PSA TOTAL: CPT

## 2022-12-05 PROCEDURE — 36415 COLL VENOUS BLD VENIPUNCTURE: CPT

## 2022-12-29 ENCOUNTER — DOCUMENTATION ONLY (OUTPATIENT)
Dept: ADMINISTRATIVE | Facility: HOSPITAL | Age: 67
End: 2022-12-29
Payer: MEDICARE

## 2023-02-22 ENCOUNTER — OFFICE VISIT (OUTPATIENT)
Dept: FAMILY MEDICINE | Facility: CLINIC | Age: 68
End: 2023-02-22
Payer: MEDICARE

## 2023-02-22 VITALS
TEMPERATURE: 98 F | DIASTOLIC BLOOD PRESSURE: 80 MMHG | HEART RATE: 61 BPM | SYSTOLIC BLOOD PRESSURE: 116 MMHG | HEIGHT: 70 IN | RESPIRATION RATE: 20 BRPM | OXYGEN SATURATION: 98 % | WEIGHT: 169.31 LBS | BODY MASS INDEX: 24.24 KG/M2

## 2023-02-22 DIAGNOSIS — K21.9 GASTROESOPHAGEAL REFLUX DISEASE WITHOUT ESOPHAGITIS: ICD-10-CM

## 2023-02-22 DIAGNOSIS — E78.00 HYPERCHOLESTEREMIA: Primary | ICD-10-CM

## 2023-02-22 LAB
CHOLEST SERPL-MCNC: 220 MG/DL
CHOLEST/HDLC SERPL: 3 {RATIO} (ref 0–5)
HDLC SERPL-MCNC: 64 MG/DL (ref 35–60)
LDLC SERPL CALC-MCNC: 143 MG/DL (ref 50–140)
TRIGL SERPL-MCNC: 63 MG/DL (ref 34–140)
VLDLC SERPL CALC-MCNC: 13 MG/DL

## 2023-02-22 PROCEDURE — 36415 COLL VENOUS BLD VENIPUNCTURE: CPT

## 2023-02-22 PROCEDURE — 99213 OFFICE O/P EST LOW 20 MIN: CPT | Mod: PBBFAC

## 2023-02-22 PROCEDURE — 80061 LIPID PANEL: CPT

## 2023-02-22 RX ORDER — ICOSAPENT ETHYL 1000 MG/1
2 CAPSULE ORAL 2 TIMES DAILY
Qty: 360 CAPSULE | Refills: 1 | Status: SHIPPED | OUTPATIENT
Start: 2023-02-22 | End: 2023-08-24 | Stop reason: SDUPTHER

## 2023-02-22 RX ORDER — FAMOTIDINE 40 MG/1
40 TABLET, FILM COATED ORAL DAILY
Qty: 30 TABLET | Refills: 11 | Status: SHIPPED | OUTPATIENT
Start: 2023-02-22 | End: 2023-08-24 | Stop reason: ALTCHOICE

## 2023-02-22 RX ORDER — FAMOTIDINE 40 MG/1
40 TABLET, FILM COATED ORAL
COMMUNITY
Start: 2022-12-23 | End: 2023-02-22

## 2023-02-22 NOTE — PROGRESS NOTES
[facility] FM Clinic Progress Note    ID:  Rad Reyna   MRN:  [id number]     2/22/2023    Chief Complaint:  routine follow up    History of Present Illness:  Rad Reyna is a 67 y.o. male who presents to [facility] FM clinic for     GERD  - stable, controlled  - Taking OTC pepcid 40 mg PRN as well as protonix  - Requests pepcid refill    Seasonal Allergies  - improved  - med compliant with daily flonase  - Denies rhinorrhea, congestion, cough, itchy eyes    HLD  - Med compliant with Vescepa  - Fasting lipid ordered prior to cards visit.  Patient states that he did go to the lab and had blood drawn though is not in the system    Sick Sinus Syndrome  Mild Aortic/Mitral regurgitation  Biventricular pacemaker  - stable  - Continue to follow with cards  - No chest pain, palpitations, dyspnea on exertion    HM:  PSA wnl  Shingrix due  [facility], [facility] referral      Health Maintenance   Topic Date Due    Hepatitis C Screening  Never done    Lipid Panel  05/21/2026    TETANUS VACCINE  05/30/2027       Social History     Tobacco Use    Smoking status: Never    Smokeless tobacco: Never   Substance Use Topics    Alcohol use: Not Currently    Drug use: Not Currently         Current Outpatient Medications:     cholecalciferol, vitamin D3, 125 mcg (5,000 unit) capsule, Take 125 mcg by mouth once daily., Disp: , Rfl:     famotidine (PEPCID) 20 MG tablet, Take 20 mg by mouth 2 (two) times daily as needed for Heartburn., Disp: , Rfl:     famotidine (PEPCID) 40 MG tablet, Take 40 mg by mouth daily 2 hours after breakfast., Disp: , Rfl:     fluticasone propionate (FLONASE) 50 mcg/actuation nasal spray, 2 sprays (100 mcg total) by Each Nostril route once daily., Disp: 18.2 mL, Rfl: 2    icosapent ethyL (VASCEPA) 1 gram Cap, Take 2 g by mouth 2 (two) times daily., Disp: , Rfl:     multivitamin-minerals-lutein Tab, Take 1 tablet by mouth once daily., Disp: , Rfl:     pantoprazole (PROTONIX) 40 MG tablet, Take 40 mg  "by mouth once daily at 6am., Disp: , Rfl:     Review of patient's allergies indicates:   Allergen Reactions    Atorvastatin      Other reaction(s): Stomach cramps    Penicillins      Other reaction(s): rash  Other reaction(s): rash      Nsaids (non-steroidal anti-inflammatory drug) Nausea Only         Review of Systems:  Positive-see above  Negative- fever, chills, weight loss, headaches, dizziness, vision changes, chest pain, palpitations, shortness of breath, wheezing, abdominal pain, nausea, vomiting, diarrhea, constipation, dysuria, lower extremity edema      Physical Exam:  /80 (BP Location: Left arm, Patient Position: Sitting, BP Method: Medium (Automatic))   Pulse 61   Temp 98.2 °F (36.8 °C) (Oral)   Resp 20   Ht 5' 10.08" (1.78 m)   Wt 76.8 kg (169 lb 5 oz)   SpO2 98%   BMI 24.24 kg/m²       General-well appearing elderly  male, sitting comfortably in exam chair no acute distress   HEENT- PERRLA, EOM intact, muddy brown sclera.  Nares patent without rhinorrhea.  Oropharynx mildly erythematous, upper dentures in place, multiple missing lower teeth.  Right-sided submandibular nontender lymphadenopathy  CV-regular rate and rhythm, no lower extremity edema   Lungs-breathing nonlabored, lungs CTA   GI-minimal epigastric tenderness, abdomen soft nondistended      Assessment/Plan:    Gastritis   - well controlled  - medications reviewed, refill as requested  - continue regimen    Allergic rhinitis   - Well controlled  - Cont Flonase nasal spray    HLD  - Lipid panel today  - Vescepa per cards, needs refill      Needs shingrix, informed denial today    David Milan MD  [employer]  Resident HO-2      "

## 2023-02-23 ENCOUNTER — TELEPHONE (OUTPATIENT)
Dept: FAMILY MEDICINE | Facility: CLINIC | Age: 68
End: 2023-02-23
Payer: MEDICARE

## 2023-02-23 NOTE — TELEPHONE ENCOUNTER
PA submitted for generic Vascepa. According to [employer], key #[id number], no PA is required, drug is covered by plan.    REFILL REQUEST

## 2023-03-14 ENCOUNTER — CLINICAL SUPPORT (OUTPATIENT)
Dept: CARDIOLOGY | Facility: CLINIC | Age: 68
End: 2023-03-14
Payer: MEDICARE

## 2023-03-14 DIAGNOSIS — Z95.0 NORMALLY FUNCTIONING CARDIAC PACEMAKER PRESENT: Primary | ICD-10-CM

## 2023-03-14 PROCEDURE — 99211 OFF/OP EST MAY X REQ PHY/QHP: CPT | Mod: PBBFAC

## 2023-03-14 PROCEDURE — 93280 PM DEVICE PROGR EVAL DUAL: CPT | Mod: PBBFAC | Performed by: INTERNAL MEDICINE

## 2023-04-11 ENCOUNTER — OFFICE VISIT (OUTPATIENT)
Dept: CARDIOLOGY | Facility: CLINIC | Age: 68
End: 2023-04-11
Payer: MEDICARE

## 2023-04-11 VITALS
HEIGHT: 70 IN | OXYGEN SATURATION: 95 % | BODY MASS INDEX: 24.08 KG/M2 | DIASTOLIC BLOOD PRESSURE: 84 MMHG | SYSTOLIC BLOOD PRESSURE: 121 MMHG | TEMPERATURE: 99 F | HEART RATE: 63 BPM | WEIGHT: 168.19 LBS | RESPIRATION RATE: 20 BRPM

## 2023-04-11 DIAGNOSIS — Z95.0 S/P PLACEMENT OF CARDIAC PACEMAKER: ICD-10-CM

## 2023-04-11 DIAGNOSIS — I38 VALVULAR HEART DISEASE: ICD-10-CM

## 2023-04-11 DIAGNOSIS — E78.00 HYPERCHOLESTEREMIA: ICD-10-CM

## 2023-04-11 DIAGNOSIS — I49.5 SICK SINUS SYNDROME: Primary | ICD-10-CM

## 2023-04-11 PROCEDURE — 99214 OFFICE O/P EST MOD 30 MIN: CPT | Mod: PBBFAC | Performed by: INTERNAL MEDICINE

## 2023-04-11 RX ORDER — EZETIMIBE 10 MG/1
10 TABLET ORAL DAILY
Qty: 90 TABLET | Refills: 3 | Status: SHIPPED | OUTPATIENT
Start: 2023-04-11 | End: 2023-12-01

## 2023-04-11 NOTE — PATIENT INSTRUCTIONS
Start Zetia 10 mg daily  Follow up for device check and clinic appointment in 6 months   Repeat fasting labs in 2 months (nothing to eat/drink after midnight)

## 2023-04-11 NOTE — PROGRESS NOTES
Cardiovascular [facility]  Cariology Clinic - Follow Up     Cardiology Attending: Dr. Brand  Date of Visit: 4/11/2023  Reason for Visit/Chief Complaint:   Chief Complaint    f/u denies chest pain or sob        Subjective:      Rad Reyna is a 67 y.o. male with a PMH significant for sick sinus syndrome s/p Medtronic dual chamber PPM 7/2021, mild AI, mild MR, and HLD, who presents to cardiology clinic for follow up.     Today, patient reports he is feeling well. Denies chest pain, SOB, dizziness. States he rides his bicycle twice a week up to 24 miles per week.    Medications:     Current Outpatient Medications   Medication Sig Dispense Refill    cholecalciferol, vitamin D3, 125 mcg (5,000 unit) capsule Take 125 mcg by mouth once daily.      famotidine (PEPCID) 40 MG tablet Take 1 tablet (40 mg total) by mouth once daily. 30 tablet 11    fluticasone propionate (FLONASE) 50 mcg/actuation nasal spray 2 sprays (100 mcg total) by Each Nostril route once daily. (Patient taking differently: 2 sprays by Each Nostril route as needed.) 18.2 mL 2    icosapent ethyL (VASCEPA) 1 gram Cap Take 2 capsules (2 g total) by mouth 2 (two) times daily. 360 capsule 1    multivitamin-minerals-lutein Tab Take 1 tablet by mouth once daily.      pantoprazole (PROTONIX) 40 MG tablet Take 40 mg by mouth once daily at 6am.       No current facility-administered medications for this visit.       I have reviewed and updated the patient's medications, allergies, past medical history, surgical history, social history and family history as needed.    Review of Systems:     Review of Systems   Respiratory:  Negative for shortness of breath.    Cardiovascular:  Negative for chest pain and leg swelling.   Neurological:  Negative for dizziness.   Objective:     Wt Readings from Last 3 Encounters:   04/11/23 76.3 kg (168 lb 3.2 oz)   02/22/23 76.8 kg (169 lb 5 oz)   11/21/22 79.2 kg (174 lb 9.6 oz)  "    Temp Readings from Last 3 Encounters:   04/11/23 98.8 °F (37.1 °C) (Oral)   02/22/23 98.2 °F (36.8 °C) (Oral)   10/17/22 98.2 °F (36.8 °C) (Oral)     BP Readings from Last 3 Encounters:   04/11/23 121/84   02/22/23 116/80   11/21/22 110/77     Pulse Readings from Last 3 Encounters:   04/11/23 63   02/22/23 61   11/21/22 71       Vitals:    04/11/23 1013   BP: 121/84   BP Location: Left arm   Patient Position: Sitting   BP Method: Medium (Automatic)   Pulse: 63   Resp: 20   Temp: 98.8 °F (37.1 °C)   TempSrc: Oral   SpO2: 95%   Weight: 76.3 kg (168 lb 3.2 oz)   Height: 5' 10" (1.778 m)     Body mass index is 24.13 kg/m².    Physical Exam  Constitutional:       General: He is in acute distress.      Appearance: He is not ill-appearing.   HENT:      Head: Normocephalic and atraumatic.   Eyes:      Extraocular Movements: Extraocular movements intact.      Conjunctiva/sclera: Conjunctivae normal.   Cardiovascular:      Rate and Rhythm: Normal rate.      Heart sounds: Murmur heard.      Comments: Systolic murmur heard throughout  Pulmonary:      Effort: Pulmonary effort is normal. No respiratory distress.      Breath sounds: No wheezing.   Musculoskeletal:      Right lower leg: No edema.      Left lower leg: No edema.   Skin:     General: Skin is warm and dry.   Neurological:      Mental Status: He is alert and oriented to person, place, and time.      Labs:   I have reviewed the following labs below:      Lab Results   Component Value Date    WBC 5.2 08/02/2022    HGB 12.9 (L) 08/02/2022    HCT 39.5 (L) 08/02/2022     08/02/2022    MCV 82.5 08/02/2022    RDW 14.3 08/02/2022     Lab Results   Component Value Date     08/02/2022    K 4.3 08/02/2022    CO2 23 08/02/2022    BUN 10.6 08/02/2022    CALCIUM 9.0 08/02/2022    MG 2.00 09/27/2021    PHOS 2.5 11/02/2018     Lab Results   Component Value Date    ALBUMIN 3.6 08/02/2022    BILITOT 0.7 08/02/2022    AST 21 08/02/2022    ALKPHOS 60 08/02/2022    ALT 17 " 08/02/2022     Cholesterol Total   Date Value Ref Range Status   02/22/2023 220 (H) <=200 mg/dL Final     HDL Cholesterol   Date Value Ref Range Status   02/22/2023 64 (H) 35 - 60 mg/dL Final     LDL Cholesterol   Date Value Ref Range Status   02/22/2023 143.00 (H) 50.00 - 140.00 mg/dL Final     Triglyceride   Date Value Ref Range Status   02/22/2023 63 34 - 140 mg/dL Final     Lab Results   Component Value Date    HGBA1C 5.4 01/15/2021    HGBA1C 5.6 05/17/2019    HGBA1C 5.4 05/30/2017    CREATININE 1.05 08/02/2022     Lab Results   Component Value Date    TSH 1.5622 04/28/2021     Lab Results   Component Value Date    IRON 63 (L) 05/17/2019    TIBC 401 05/17/2019    FERRITIN 82.8 05/17/2019     Lab Results   Component Value Date    INR 1.24 (H) 07/21/2021    PROTIME 15.4 (H) 07/21/2021      Lab Results   Component Value Date    TROPONINI <0.010 08/03/2022    TROPONINI <0.010 08/02/2022    TROPONINI <0.010 04/28/2021    TROPONINI <0.015 02/12/2020    TROPONINI <0.015 09/25/2019    BNP 23.7 04/28/2021    BNP 43.2 10/07/2017     Cardiovascular Studies:   I have reviewed the following studies below:      TTE (4.14.21):   Mild to moderately dilated left ventricle  Left ventricular ejection fraction is measured approximately 55 to 60%  Mild late systolic prolapse of mitral valve  Mild mitral regurgitation  Structurally aortic valve is trileaflet  Mild aortic regurgitation present  Tricuspid valve is structurally normal  There is trace tricuspid regurgitation with RVSP estimated at 38 mmHg  Pulmonic valve is structurally normal  Trace pulmonic regurgitation present  Mildly dilated right atrium  Right ventricle global systolic function is hyperdynamic  Mildly enlarged right ventricle cavity    48-hour Holter monitor (6.20):  The patient was in sinus bradycardia with an average heart rate of 48 bpm. Heart rates less than 50 bpm were noted 82% of the time.  Supraventricular ectopic beats consisted of occasional premature  beats. Longest SVT run was 12 beats long and at heart rate of 108 bpm. Fastest SVT run was 6 beats long and at heart rate 158 bpm.  Ventricular ectopic beats consisted of occasional unifocal beats with episodes of couplets and bigeminy.  2 pauses exceeding 2.0 seconds. The longest pause was 2.5 seconds.  No diary was returned.  Findings are consistent with sick sinus syndrome. Consider permanent pacemaker if clinically indicated.    Treadmill stress test (1.31.18):  Negative for ischemia, satisfactory exercise tolerance, no chest pain, and frequent isolated PVCs and bigeminy.    Assessment/Plan:   67 y.o. male with a PMH significant for sick sinus syndrome s/p Medtronic dual chamber PPM 7/2021, mild AI, mild MR, and HLD, who presents to cardiology clinic for follow up.     Sick Sinus Syndrome  - s/p dual-chamber pacemaker implantation on 7.21.21 (Medtronic)  Last device interrogation 3.14.23 showed 95.3% A pacing, no V pacing. Episodes of AHR noted with HR 200s, longest lasting 1ume37oyn. No Afib.  Episodes of NS-VHRs all 1-2sec   - Continue device interrogation q6 months, next due 9/2023    Valvular Heart Disease  - EF 55 to 60%, mild mitral regurgitation, mild aortic regurgitation, trace tricuspid regurgitation with RVSP 38 and trace pulmonic regurgitation. (TTE 4.14.21)  - Moderate MR -stable (previously noted on TTE 9/2017), Trace TR, mild AR, (TTE 3.11.20)  - pt will need serial echocardiograms q3-5 years  - Denies SOB or RAO     Hypercholesterolemia  - FLP 2.2023: Cholesterol 220 from 191,  from 123  - 10 year ASCVD risk is 8.6% with optimal risk 7.3%  - Had an extensive discussion about the benefits of a statin and the fact that Vascepa does not improve cholesterol only triglycerides, patient resistant to statin given concern for long term effects, open to starting Zetia  - Start Zetia 10mg daily  - Repeat FLP in 2-3 months      Return to clinic in 6 months with device check same day if  possible.      Margarita Enamorado MD  [facility], -1  4/11/2023

## 2023-05-23 ENCOUNTER — OFFICE VISIT (OUTPATIENT)
Dept: FAMILY MEDICINE | Facility: CLINIC | Age: 68
End: 2023-05-23
Payer: MEDICARE

## 2023-05-23 VITALS
RESPIRATION RATE: 18 BRPM | WEIGHT: 177.81 LBS | HEIGHT: 70 IN | TEMPERATURE: 99 F | HEART RATE: 77 BPM | DIASTOLIC BLOOD PRESSURE: 79 MMHG | OXYGEN SATURATION: 100 % | SYSTOLIC BLOOD PRESSURE: 117 MMHG | BODY MASS INDEX: 25.45 KG/M2

## 2023-05-23 DIAGNOSIS — E78.00 HYPERCHOLESTEREMIA: ICD-10-CM

## 2023-05-23 DIAGNOSIS — Z53.20 COLONOSCOPY REFUSED: ICD-10-CM

## 2023-05-23 DIAGNOSIS — Z28.21 IMMUNIZATION REFUSED: ICD-10-CM

## 2023-05-23 DIAGNOSIS — K21.9 GASTROESOPHAGEAL REFLUX DISEASE, UNSPECIFIED WHETHER ESOPHAGITIS PRESENT: Primary | ICD-10-CM

## 2023-05-23 PROCEDURE — 99214 OFFICE O/P EST MOD 30 MIN: CPT | Mod: PBBFAC

## 2023-05-23 NOTE — PROGRESS NOTES
[facility] FM Clinic Progress Note    ID:  Rad Reyna   MRN:  [id number]     5/23/2023    Chief Complaint:  routine follow up    History of Present Illness:  Rad Reyna is a 67 y.o. male who presents to [facility] FM clinic for     GERD  - stable, controlled  - Taking OTC pepcid 20 BID PRN, no longer on protonix    Seasonal Allergies  - med compliant with daily flonase  - Denies rhinorrhea, congestion, cough, itchy eyes    HLD  - ASCVD risk is 8.1 percent  - Med compliant with Vescepa  - Fasting lipid ordered prior to cards visit.  Patient states that he did go to the lab and had blood drawn though is not in the system  - Extensive discussion held per Cardiology, started on Zetia 10 mg daily plan repeat FLP in 2-3 months    Sick Sinus Syndrome  Mild Aortic/Mitral regurgitation  Biventricular pacemaker  - Continue to follow with cards  - No chest pain, palpitations, dyspnea on exertion  - 6 month interval pacer interrogation, 95% atrial pacing    HM:  PSA wnl  Shingrix due  [facility] not yet performed, Informed colonoscopy deferred      Health Maintenance   Topic Date Due    Hepatitis C Screening  Never done    TETANUS VACCINE  05/30/2027    Lipid Panel  02/22/2028       Social History     Tobacco Use    Smoking status: Never    Smokeless tobacco: Never   Substance Use Topics    Alcohol use: Not Currently    Drug use: Not Currently         Current Outpatient Medications:     cholecalciferol, vitamin D3, 125 mcg (5,000 unit) capsule, Take 125 mcg by mouth once daily., Disp: , Rfl:     ezetimibe (ZETIA) 10 mg tablet, Take 1 tablet (10 mg total) by mouth once daily., Disp: 90 tablet, Rfl: 3    famotidine (PEPCID) 40 MG tablet, Take 1 tablet (40 mg total) by mouth once daily., Disp: 30 tablet, Rfl: 11    fluticasone propionate (FLONASE) 50 mcg/actuation nasal spray, 2 sprays (100 mcg total) by Each Nostril route once daily. (Patient taking differently: 2 sprays by Each Nostril route as  "needed.), Disp: 18.2 mL, Rfl: 2    icosapent ethyL (VASCEPA) 1 gram Cap, Take 2 capsules (2 g total) by mouth 2 (two) times daily., Disp: 360 capsule, Rfl: 1    multivitamin-minerals-lutein Tab, Take 1 tablet by mouth once daily., Disp: , Rfl:     pantoprazole (PROTONIX) 40 MG tablet, Take 40 mg by mouth once daily at 6am., Disp: , Rfl:     Review of patient's allergies indicates:   Allergen Reactions    Aspirin Other (See Comments)     Stomach burning    Atorvastatin      Other reaction(s): Stomach cramps    Penicillins      Other reaction(s): rash  Other reaction(s): rash      Nsaids (non-steroidal anti-inflammatory drug) Nausea Only         Review of Systems:  See HPI      Physical Exam:  /79 (BP Location: Right arm, Patient Position: Sitting, BP Method: Large (Automatic))   Pulse 77   Temp 98.6 °F (37 °C) (Oral)   Resp 18   Ht 5' 10" (1.778 m)   Wt 80.6 kg (177 lb 12.8 oz)   SpO2 100%   BMI 25.51 kg/m²       General-well appearing elderly  male, sitting comfortably in exam chair no acute distress   HEENT- PERRLA, EOM intact, muddy brown sclera.  Nares patent without rhinorrhea. Oropharynx clear  CV-regular rate and rhythm, no lower extremity edema   Lungs-breathing nonlabored, lungs CTA   GI-no epigastric tenderness, abdomen soft nondistended      Assessment/Plan:    GERD  - well controlled  - attempted deescalation of therapy, DC Protonix  - continue p.r.n. Pepcid    Allergic rhinitis   - Well controlled  - Cont Flonase nasal spray    HLD  - doing well on Zetia   - lipid panel ordered for repeat roughly one-month  - keep scheduled follow-up with Cardiology        Needs shingrix, informed denial today  Refusal colonoscopy    David Milan MD  [employer] FM Resident HO-2        "

## 2023-05-24 NOTE — PROGRESS NOTES
Faculty Attestation: Rad Reyna  was seen in Family Medicine Clinic. Patient seen and evaluated at the time of the visit. History of Present Illness, Physical Exam, and Assessment and Plan reviewed. Treatment plan is reasonable and appropriate. Compliance with treatment recommendations is important.  No imaging studies were done today.  No procedure was performed.     Cortez Jasso MD

## 2023-06-06 NOTE — PROGRESS NOTES
Cardiology attending addendum  Patient was seen and examined with Dr. Margarita Enamorado. Agree with plan as outlined above.     Judit Brand MD  Cardiology-[facility]

## 2023-06-08 ENCOUNTER — HOSPITAL ENCOUNTER (EMERGENCY)
Facility: HOSPITAL | Age: 68
Discharge: HOME | End: 2023-06-08
Attending: STUDENT IN AN ORGANIZED HEALTH CARE EDUCATION/TRAINING PROGRAM
Payer: MEDICARE

## 2023-06-08 VITALS
WEIGHT: 166.44 LBS | HEIGHT: 70 IN | TEMPERATURE: 99 F | HEART RATE: 66 BPM | DIASTOLIC BLOOD PRESSURE: 77 MMHG | SYSTOLIC BLOOD PRESSURE: 115 MMHG | OXYGEN SATURATION: 97 % | RESPIRATION RATE: 18 BRPM | BODY MASS INDEX: 23.83 KG/M2

## 2023-06-08 DIAGNOSIS — J02.0 STREPTOCOCCAL PHARYNGITIS: Primary | ICD-10-CM

## 2023-06-08 LAB
ALBUMIN SERPL-MCNC: 3.6 G/DL (ref 3.4–4.8)
ALBUMIN/GLOB SERPL: 1 RATIO (ref 1.1–2)
ALP SERPL-CCNC: 60 UNIT/L (ref 40–150)
ALT SERPL-CCNC: 17 UNIT/L (ref 0–55)
AST SERPL-CCNC: 22 UNIT/L (ref 5–34)
BASOPHILS # BLD AUTO: 0.07 X10(3)/MCL
BASOPHILS NFR BLD AUTO: 0.6 %
BILIRUBIN DIRECT+TOT PNL SERPL-MCNC: 0.6 MG/DL
BUN SERPL-MCNC: 11.9 MG/DL (ref 8.4–25.7)
CALCIUM SERPL-MCNC: 8.9 MG/DL (ref 8.8–10)
CHLORIDE SERPL-SCNC: 102 MMOL/L (ref 98–107)
CO2 SERPL-SCNC: 23 MMOL/L (ref 23–31)
CREAT SERPL-MCNC: 1.24 MG/DL (ref 0.73–1.18)
EOSINOPHIL # BLD AUTO: 0.01 X10(3)/MCL (ref 0–0.9)
EOSINOPHIL NFR BLD AUTO: 0.1 %
ERYTHROCYTE [DISTWIDTH] IN BLOOD BY AUTOMATED COUNT: 14.8 % (ref 11.5–17)
FLUAV AG UPPER RESP QL IA.RAPID: NOT DETECTED
FLUBV AG UPPER RESP QL IA.RAPID: NOT DETECTED
GFR SERPLBLD CREATININE-BSD FMLA CKD-EPI: >60 MLS/MIN/1.73/M2
GLOBULIN SER-MCNC: 3.6 GM/DL (ref 2.4–3.5)
GLUCOSE SERPL-MCNC: 95 MG/DL (ref 82–115)
HCT VFR BLD AUTO: 40 % (ref 42–52)
HGB BLD-MCNC: 13.1 G/DL (ref 14–18)
IMM GRANULOCYTES # BLD AUTO: 0.04 X10(3)/MCL (ref 0–0.04)
IMM GRANULOCYTES NFR BLD AUTO: 0.4 %
LYMPHOCYTES # BLD AUTO: 1.08 X10(3)/MCL (ref 0.6–4.6)
LYMPHOCYTES NFR BLD AUTO: 9.9 %
MCH RBC QN AUTO: 27.1 PG (ref 27–31)
MCHC RBC AUTO-ENTMCNC: 32.8 G/DL (ref 33–36)
MCV RBC AUTO: 82.8 FL (ref 80–94)
MONOCYTES # BLD AUTO: 1.17 X10(3)/MCL (ref 0.1–1.3)
MONOCYTES NFR BLD AUTO: 10.7 %
NEUTROPHILS # BLD AUTO: 8.58 X10(3)/MCL (ref 2.1–9.2)
NEUTROPHILS NFR BLD AUTO: 78.3 %
NRBC BLD AUTO-RTO: 0 %
PLATELET # BLD AUTO: 161 X10(3)/MCL (ref 130–400)
PMV BLD AUTO: 9.4 FL (ref 7.4–10.4)
POTASSIUM SERPL-SCNC: 4.1 MMOL/L (ref 3.5–5.1)
PROT SERPL-MCNC: 7.2 GM/DL (ref 5.8–7.6)
RBC # BLD AUTO: 4.83 X10(6)/MCL (ref 4.7–6.1)
RSV A 5' UTR RNA NPH QL NAA+PROBE: NOT DETECTED
SARS-COV-2 RNA RESP QL NAA+PROBE: NOT DETECTED
SODIUM SERPL-SCNC: 134 MMOL/L (ref 136–145)
STREP A PCR (OHS): DETECTED
WBC # SPEC AUTO: 10.95 X10(3)/MCL (ref 4.5–11.5)

## 2023-06-08 PROCEDURE — 99284 EMERGENCY DEPT VISIT MOD MDM: CPT | Mod: 25

## 2023-06-08 PROCEDURE — 85025 COMPLETE CBC W/AUTO DIFF WBC: CPT | Performed by: STUDENT IN AN ORGANIZED HEALTH CARE EDUCATION/TRAINING PROGRAM

## 2023-06-08 PROCEDURE — 80053 COMPREHEN METABOLIC PANEL: CPT | Performed by: STUDENT IN AN ORGANIZED HEALTH CARE EDUCATION/TRAINING PROGRAM

## 2023-06-08 PROCEDURE — 87651 STREP A DNA AMP PROBE: CPT | Performed by: STUDENT IN AN ORGANIZED HEALTH CARE EDUCATION/TRAINING PROGRAM

## 2023-06-08 PROCEDURE — 0241U COVID/RSV/FLU A&B PCR: CPT | Performed by: STUDENT IN AN ORGANIZED HEALTH CARE EDUCATION/TRAINING PROGRAM

## 2023-06-08 RX ORDER — AZITHROMYCIN 250 MG/1
500 TABLET, FILM COATED ORAL DAILY
Qty: 10 TABLET | Refills: 0 | Status: SHIPPED | OUTPATIENT
Start: 2023-06-08 | End: 2023-06-13

## 2023-06-09 NOTE — ED PROVIDER NOTES
Encounter Date: 6/8/2023       History     Chief Complaint   Patient presents with    Fever     Fever , coughing and sore throat.  X 2 days     67-year-old male presents to ED for sore throat, fatigue and decreased appetite.  Has been ongoing for approximately 2 days.  No sick contacts.  No recent antibiotics.  Denies any nausea or vomiting, no fevers.  Reports a generalized sore throat.  No voice change, no difficulty swallowing or breathing.  No shortness of breath.  Intermittent dry cough.  No chest pain or pressure, no pleuritic component, no other HEENT complaints.    Review of patient's allergies indicates:   Allergen Reactions    Aspirin Other (See Comments)     Stomach burning    Atorvastatin      Other reaction(s): Stomach cramps    Penicillins      Other reaction(s): rash  Other reaction(s): rash      Nsaids (non-steroidal anti-inflammatory drug) Nausea Only     Past Medical History:   Diagnosis Date    Atrial fibrillation     Hyperlipidemia     Hypertension      Past Surgical History:   Procedure Laterality Date    INSERT / REPLACE / REMOVE PACEMAKER       Family History   Problem Relation Age of Onset    Heart attack Mother     Hypertension Father      Social History     Tobacco Use    Smoking status: Never    Smokeless tobacco: Never   Substance Use Topics    Alcohol use: Not Currently    Drug use: Not Currently     Review of Systems   Constitutional:  Positive for appetite change and fever. Negative for chills.   HENT:  Positive for sore throat. Negative for congestion and rhinorrhea.    Eyes:  Negative for pain, discharge and itching.   Respiratory:  Positive for cough. Negative for chest tightness and shortness of breath.    Cardiovascular:  Positive for chest pain. Negative for palpitations.   Gastrointestinal:  Negative for abdominal pain, nausea and vomiting.   Genitourinary:  Negative for dysuria and hematuria.   Musculoskeletal:  Negative for myalgias and neck pain.   Skin:  Negative for color  change and rash.   Neurological:  Negative for dizziness, weakness and headaches.   Psychiatric/Behavioral:  Negative for confusion. The patient is not hyperactive.      Physical Exam     Initial Vitals [06/08/23 1938]   BP Pulse Resp Temp SpO2   115/77 66 18 99.3 °F (37.4 °C) 97 %      MAP       --         Physical Exam    Constitutional: He appears well-developed and well-nourished. He is not diaphoretic. No distress.   HENT:   Head: Normocephalic and atraumatic.   Right Ear: Tympanic membrane and ear canal normal.   Left Ear: Tympanic membrane and ear canal normal.   Mouth/Throat: Uvula is midline and mucous membranes are normal. No oral lesions. No trismus in the jaw. No uvula swelling. Posterior oropharyngeal erythema present. No oropharyngeal exudate, posterior oropharyngeal edema or tonsillar abscesses.   Eyes: Conjunctivae and EOM are normal. Pupils are equal, round, and reactive to light.   Neck: Neck supple. No tracheal deviation present.   Normal range of motion.  Cardiovascular:  Normal rate, regular rhythm and normal heart sounds.           Pulmonary/Chest: Breath sounds normal. No accessory muscle usage. No tachypnea. No respiratory distress. He has no decreased breath sounds. He has no wheezes. He has no rhonchi. He has no rales.   Abdominal: Abdomen is soft. There is no abdominal tenderness. There is no rebound.   Musculoskeletal:         General: No tenderness. Normal range of motion.      Cervical back: Normal range of motion and neck supple.     Neurological: He is alert and oriented to person, place, and time. He has normal strength. GCS score is 15. GCS eye subscore is 4. GCS verbal subscore is 5. GCS motor subscore is 6.   Skin: Skin is warm and dry. Capillary refill takes less than 2 seconds. No rash noted.   Psychiatric: He has a normal mood and affect. His behavior is normal. Judgment and thought content normal.       ED Course   Procedures  Labs Reviewed   COMPREHENSIVE METABOLIC PANEL -  Abnormal; Notable for the following components:       Result Value    Sodium Level 134 (*)     Creatinine 1.24 (*)     Globulin 3.6 (*)     Albumin/Globulin Ratio 1.0 (*)     All other components within normal limits   STREP GROUP A BY PCR - Abnormal; Notable for the following components:    STREP A PCR ([facility]) Detected (*)     All other components within normal limits    Narrative:     The Xpert Xpress Strep A test is a rapid, qualitative in vitro diagnostic test for the detection of Streptococcus pyogenes (Group A ß-hemolytic Streptococcus, Strep A) in throat swab specimens from patients with signs and symptoms of pharyngitis.     CBC WITH DIFFERENTIAL - Abnormal; Notable for the following components:    Hgb 13.1 (*)     Hct 40.0 (*)     MCHC 32.8 (*)     All other components within normal limits   COVID/RSV/FLU A&B PCR - Normal    Narrative:     The Xpert Xpress SARS-CoV-2/FLU/RSV plus is a rapid, multiplexed real-time PCR test intended for the simultaneous qualitative detection and differentiation of SARS-CoV-2, Influenza A, Influenza B, and respiratory syncytial virus (RSV) viral RNA in either nasopharyngeal swab or nasal swab specimens.         CBC W/ AUTO DIFFERENTIAL    Narrative:     The following orders were created for panel order CBC auto differential.  Procedure                               Abnormality         Status                     ---------                               -----------         ------                     CBC with Differential[[id number]]        Abnormal            Final result                 Please view results for these tests on the individual orders.   EXTRA TUBES    Narrative:     The following orders were created for panel order EXTRA TUBES.  Procedure                               Abnormality         Status                     ---------                               -----------         ------                     Red Top Hold[[id number]]                                     In process                    Please view results for these tests on the individual orders.   RED TOP HOLD          Imaging Results              X-Ray Chest PA And Lateral (Final result)  Result time 06/08/23 20:09:53      Final result by Vladislav Soto MD (06/08/23 20:09:53)                   Impression:      No acute abnormality.      Electronically signed by: Vladislav Soto MD  Date:    06/08/2023  Time:    20:09               Narrative:    EXAMINATION:  XR CHEST PA AND LATERAL    CLINICAL HISTORY:  fever;    TECHNIQUE:  PA and lateral views of the chest were performed.    COMPARISON:  08/02/2022    FINDINGS:  The lungs are clear, with normal appearance of pulmonary vasculature and no pleural effusion or pneumothorax.    The cardiac silhouette is normal in size. The hilar and mediastinal contours are unremarkable.    Bones are intact. Left chest wall pacer is identified.                                       Medications - No data to display  Medical Decision Making:   History:   Old Medical Records: I decided to obtain old medical records.  Initial Assessment:   67-year-old male presents ED with decreased p.o. intake, sore throat and cough  Differential Diagnosis:   Streptococcal pharyngitis  Mononucleosis  URI  Viral syndrome  Allergic rhinitis  COVID  Influenza  Pneumonia  Clinical Tests:   Lab Tests: Ordered and Reviewed  Radiological Study: Reviewed and Ordered  ED Management:  Vitals stable and exam benign besides a mildly erythematous posterior pharynx.  No shift, no swelling, no voice change, tolerating p.o. intake without difficulty.  Workup demonstrated grossly normal labs.  No significant evidence or dehydration or volume depletion.  Chest x-ray clear.  Ultimately tested positive for strep. Given penicillin allergy prescribed azithromycin antibiotics. Is to follow up closely in the outpatient setting.  Will continue to treat symptomatically and supportively.  Strict return precautions provided and released. (Zmora)                         Clinical Impression:   Final diagnoses:  [J02.0] Streptococcal pharyngitis (Primary)        ED Disposition Condition    Discharge Stable          ED Prescriptions       Medication Sig Dispense Start Date End Date Auth. Provider    azithromycin (ZITHROMAX Z-JESSICA) 250 MG tablet Take 2 tablets (500 mg total) by mouth once daily. for 5 days 10 tablet 6/8/2023 6/13/2023 Warren Ovalle MD          Follow-up Information       Follow up With Specialties Details Why Contact Info    David Milan MD Family Medicine Schedule an appointment as soon as possible for a visit in 3 days  [address] [facility] [address]  [phone]      [facility] Emergency Medicine  As needed, If symptoms worsen [facility] [id number] [phone]             Warren Ovalle MD  06/12/23 3195

## 2023-08-04 ENCOUNTER — HOSPITAL ENCOUNTER (EMERGENCY)
Facility: HOSPITAL | Age: 68
Discharge: HOME | End: 2023-08-04
Attending: STUDENT IN AN ORGANIZED HEALTH CARE EDUCATION/TRAINING PROGRAM
Payer: MEDICARE

## 2023-08-04 VITALS
HEIGHT: 70 IN | DIASTOLIC BLOOD PRESSURE: 86 MMHG | RESPIRATION RATE: 11 BRPM | SYSTOLIC BLOOD PRESSURE: 124 MMHG | HEART RATE: 60 BPM | OXYGEN SATURATION: 99 % | BODY MASS INDEX: 23.17 KG/M2 | TEMPERATURE: 98 F | WEIGHT: 161.81 LBS

## 2023-08-04 DIAGNOSIS — R42 VERTIGO: Primary | ICD-10-CM

## 2023-08-04 DIAGNOSIS — R42 DIZZINESS: ICD-10-CM

## 2023-08-04 LAB
ALBUMIN SERPL-MCNC: 3.6 G/DL (ref 3.4–4.8)
ALBUMIN/GLOB SERPL: 1 RATIO (ref 1.1–2)
ALP SERPL-CCNC: 64 UNIT/L (ref 40–150)
ALT SERPL-CCNC: 22 UNIT/L (ref 0–55)
AST SERPL-CCNC: 22 UNIT/L (ref 5–34)
BASOPHILS # BLD AUTO: 0.05 X10(3)/MCL
BASOPHILS NFR BLD AUTO: 1.2 %
BILIRUBIN DIRECT+TOT PNL SERPL-MCNC: 0.5 MG/DL
BUN SERPL-MCNC: 11.8 MG/DL (ref 8.4–25.7)
CALCIUM SERPL-MCNC: 9.1 MG/DL (ref 8.8–10)
CHLORIDE SERPL-SCNC: 104 MMOL/L (ref 98–107)
CO2 SERPL-SCNC: 29 MMOL/L (ref 23–31)
CREAT SERPL-MCNC: 1.01 MG/DL (ref 0.73–1.18)
EOSINOPHIL # BLD AUTO: 0.1 X10(3)/MCL (ref 0–0.9)
EOSINOPHIL NFR BLD AUTO: 2.4 %
ERYTHROCYTE [DISTWIDTH] IN BLOOD BY AUTOMATED COUNT: 15 % (ref 11.5–17)
GFR SERPLBLD CREATININE-BSD FMLA CKD-EPI: >60 MLS/MIN/1.73/M2
GLOBULIN SER-MCNC: 3.6 GM/DL (ref 2.4–3.5)
GLUCOSE SERPL-MCNC: 112 MG/DL (ref 82–115)
HCT VFR BLD AUTO: 41.9 % (ref 42–52)
HGB BLD-MCNC: 13.2 G/DL (ref 14–18)
IMM GRANULOCYTES # BLD AUTO: 0.01 X10(3)/MCL (ref 0–0.04)
IMM GRANULOCYTES NFR BLD AUTO: 0.2 %
LACTATE SERPL-SCNC: 1 MMOL/L (ref 0.5–2.2)
LYMPHOCYTES # BLD AUTO: 2.07 X10(3)/MCL (ref 0.6–4.6)
LYMPHOCYTES NFR BLD AUTO: 48.7 %
MAGNESIUM SERPL-MCNC: 2.3 MG/DL (ref 1.6–2.6)
MCH RBC QN AUTO: 26.7 PG (ref 27–31)
MCHC RBC AUTO-ENTMCNC: 31.5 G/DL (ref 33–36)
MCV RBC AUTO: 84.8 FL (ref 80–94)
MONOCYTES # BLD AUTO: 0.39 X10(3)/MCL (ref 0.1–1.3)
MONOCYTES NFR BLD AUTO: 9.2 %
NEUTROPHILS # BLD AUTO: 1.63 X10(3)/MCL (ref 2.1–9.2)
NEUTROPHILS NFR BLD AUTO: 38.3 %
NRBC BLD AUTO-RTO: 0 %
PLATELET # BLD AUTO: 169 X10(3)/MCL (ref 130–400)
PMV BLD AUTO: 9.2 FL (ref 7.4–10.4)
POTASSIUM SERPL-SCNC: 3.7 MMOL/L (ref 3.5–5.1)
PROT SERPL-MCNC: 7.2 GM/DL (ref 5.8–7.6)
RBC # BLD AUTO: 4.94 X10(6)/MCL (ref 4.7–6.1)
SODIUM SERPL-SCNC: 139 MMOL/L (ref 136–145)
TROPONIN I SERPL-MCNC: <0.01 NG/ML (ref 0–0.04)
TSH SERPL-ACNC: 1.36 UIU/ML (ref 0.35–4.94)
WBC # SPEC AUTO: 4.25 X10(3)/MCL (ref 4.5–11.5)

## 2023-08-04 PROCEDURE — 84443 ASSAY THYROID STIM HORMONE: CPT | Performed by: STUDENT IN AN ORGANIZED HEALTH CARE EDUCATION/TRAINING PROGRAM

## 2023-08-04 PROCEDURE — 99285 EMERGENCY DEPT VISIT HI MDM: CPT | Mod: 25

## 2023-08-04 PROCEDURE — 85025 COMPLETE CBC W/AUTO DIFF WBC: CPT | Performed by: PHYSICIAN ASSISTANT

## 2023-08-04 PROCEDURE — 83605 ASSAY OF LACTIC ACID: CPT | Performed by: STUDENT IN AN ORGANIZED HEALTH CARE EDUCATION/TRAINING PROGRAM

## 2023-08-04 PROCEDURE — 83735 ASSAY OF MAGNESIUM: CPT | Performed by: STUDENT IN AN ORGANIZED HEALTH CARE EDUCATION/TRAINING PROGRAM

## 2023-08-04 PROCEDURE — 25000003 PHARM REV CODE 250: Performed by: STUDENT IN AN ORGANIZED HEALTH CARE EDUCATION/TRAINING PROGRAM

## 2023-08-04 PROCEDURE — 84484 ASSAY OF TROPONIN QUANT: CPT | Performed by: PHYSICIAN ASSISTANT

## 2023-08-04 PROCEDURE — 80053 COMPREHEN METABOLIC PANEL: CPT | Performed by: PHYSICIAN ASSISTANT

## 2023-08-04 PROCEDURE — 93005 ELECTROCARDIOGRAM TRACING: CPT

## 2023-08-04 RX ORDER — MECLIZINE HYDROCHLORIDE 25 MG/1
25 TABLET ORAL 3 TIMES DAILY PRN
Qty: 20 TABLET | Refills: 0 | Status: SHIPPED | OUTPATIENT
Start: 2023-08-04

## 2023-08-04 RX ORDER — MECLIZINE HYDROCHLORIDE 25 MG/1
25 TABLET ORAL
Status: COMPLETED | OUTPATIENT
Start: 2023-08-04 | End: 2023-08-04

## 2023-08-04 RX ADMIN — MECLIZINE HYDROCHLORIDE 25 MG: 25 TABLET ORAL at 07:08

## 2023-08-04 NOTE — FIRST PROVIDER EVALUATION
"Medical screening examination initiated.  I have conducted a focused provider triage encounter, findings are as follows:    Brief history of present illness:  Patient with pmhx of sick sinus syndrome, HLD, and GERD presents today c/o dizziness.     Vitals:    08/04/23 1837   BP: 125/81   Pulse: 63   Resp: 18   Temp: 97.8 °F (36.6 °C)   TempSrc: Oral   SpO2: 99%   Weight: 73.4 kg (161 lb 13.1 oz)   Height: 5' 10" (1.778 m)       Pertinent physical exam:  Vitals stable.     Brief workup plan:  EKG, labs     Preliminary workup initiated; this workup will be continued and followed by the physician or advanced practice provider that is assigned to the patient when roomed.  "

## 2023-08-05 NOTE — ED PROVIDER NOTES
Encounter Date: 8/4/2023       History     Chief Complaint   Patient presents with    Vomiting     Pt states having dizziness all day    Dizziness     Patient presents to the emergency department due to dizziness.  He states it started last night, states anytime he tries to move or when he leaned over in the bed he felt like everything is spinning around.  He states when he is sitting still he just does not quite feel right, it is mostly when he tries to move around and move his head.  Now that he is in the ER he does state he feels better, still a little dizzy but it is much improved from earlier.  Denies any changes in vision hearing loss tinnitus, difficulty speaking or weakness or numbness in his extremities.  No chest pain or shortness of breath    The history is provided by the patient.     Review of patient's allergies indicates:   Allergen Reactions    Aspirin Other (See Comments)     Stomach burning    Atorvastatin      Other reaction(s): Stomach cramps    Penicillins      Other reaction(s): rash  Other reaction(s): rash      Nsaids (non-steroidal anti-inflammatory drug) Nausea Only     Past Medical History:   Diagnosis Date    Atrial fibrillation     Hyperlipidemia     Hypertension      Past Surgical History:   Procedure Laterality Date    INSERT / REPLACE / REMOVE PACEMAKER       Family History   Problem Relation Age of Onset    Heart attack Mother     Hypertension Father      Social History     Tobacco Use    Smoking status: Never    Smokeless tobacco: Never   Substance Use Topics    Alcohol use: Not Currently    Drug use: Not Currently     Review of Systems   Constitutional:  Negative for chills and fever.   HENT:  Negative for congestion and sore throat.    Respiratory:  Negative for cough and shortness of breath.    Cardiovascular:  Negative for chest pain and palpitations.   Gastrointestinal:  Negative for abdominal pain and nausea.   Genitourinary:  Negative for dysuria and hematuria.    Musculoskeletal:  Negative for arthralgias and myalgias.   Neurological:  Positive for dizziness. Negative for weakness.       Physical Exam     Initial Vitals [08/04/23 1837]   BP Pulse Resp Temp SpO2   125/81 63 18 97.8 °F (36.6 °C) 99 %      MAP       --         Physical Exam    Nursing note and vitals reviewed.  Constitutional: He appears well-developed and well-nourished.   HENT:   Head: Normocephalic and atraumatic.   Eyes: Conjunctivae are normal. Pupils are equal, round, and reactive to light.   Neck: Neck supple.   Normal range of motion.  Cardiovascular:  Normal rate and regular rhythm.           Pulmonary/Chest: Breath sounds normal. No respiratory distress.   Abdominal: Abdomen is soft. There is no abdominal tenderness.   Musculoskeletal:         General: No edema. Normal range of motion.      Cervical back: Normal range of motion and neck supple.     Neurological: He is alert and oriented to person, place, and time. He has normal strength. No cranial nerve deficit or sensory deficit.   No cerebellar signs   Skin: Skin is warm and dry.         ED Course   Procedures  Labs Reviewed   COMPREHENSIVE METABOLIC PANEL - Abnormal; Notable for the following components:       Result Value    Globulin 3.6 (*)     Albumin/Globulin Ratio 1.0 (*)     All other components within normal limits   CBC WITH DIFFERENTIAL - Abnormal; Notable for the following components:    WBC 4.25 (*)     Hgb 13.2 (*)     Hct 41.9 (*)     MCH 26.7 (*)     MCHC 31.5 (*)     Neut # 1.63 (*)     All other components within normal limits   TROPONIN I - Normal   LACTIC ACID, PLASMA - Normal   TSH - Normal   MAGNESIUM - Normal   CBC W/ AUTO DIFFERENTIAL    Narrative:     The following orders were created for panel order CBC auto differential.  Procedure                               Abnormality         Status                     ---------                               -----------         ------                     CBC with  Differential[[id number]]        Abnormal            Final result                 Please view results for these tests on the individual orders.   URINALYSIS, REFLEX TO URINE CULTURE   EXTRA TUBES    Narrative:     The following orders were created for panel order EXTRA TUBES.  Procedure                               Abnormality         Status                     ---------                               -----------         ------                     Light Blue Top Hold[[id number]]                              In process                 Red Top Hold[[id number]]                                     In process                   Please view results for these tests on the individual orders.   LIGHT BLUE TOP HOLD   RED TOP HOLD     EKG Readings: (Independently Interpreted)   Initial Reading: No STEMI. Rhythm: Paced Rhythm. Heart Rate: 61. Conduction: Normal. Axis: Left Axis Deviation.       Imaging Results              CT Head Without Contrast (Final result)  Result time 08/04/23 20:58:01      Final result by Vladislav Montoya MD (08/04/23 20:58:01)                   Impression:      No acute intracranial abnormality.      Electronically signed by: Vladislav Montoya MD  Date:    08/04/2023  Time:    20:58               Narrative:    EXAMINATION:  CT HEAD WITHOUT CONTRAST    CLINICAL HISTORY:  Dizziness, persistent/recurrent, cardiac or vascular cause suspected;    TECHNIQUE:  Axial images of the head were obtained without IV contrast administration.  Coronal and sagittal reconstructions were provided.  Three dimensional and MIP images were obtained and evaluated.  Total DLP was 978 mGy-cm. Dose lowering technique and automated exposure control were utilized for this exam.    COMPARISON:  CT of the head 09/22/2015.    FINDINGS:  There is normal brain formation.  There is normal gray-white matter differentiation.  There is no hemorrhage, hydrocephalus, or midline shift.  There is no cytotoxic or vasogenic edema.  There is no intra or  extra-axial fluid collection.  There is no herniation.    The calvarium is intact.  There is no fracture.  The bilateral orbits are normal.  The paranasal sinuses are free of disease.                                       X-Ray Chest AP Portable (Final result)  Result time 08/04/23 19:18:58      Final result by Armando Whiting MD (08/04/23 19:18:58)                   Impression:      No acute pulmonary process identified.      Electronically signed by: Armando Whiting  Date:    08/04/2023  Time:    19:18               Narrative:    EXAMINATION:  XR CHEST AP PORTABLE    CLINICAL HISTORY:  Dizziness and weakness;    TECHNIQUE:  Frontal view(s) of the chest.    COMPARISON:  Radiography 06/08/2023    FINDINGS:  Left-sided pacemaker.  Normal cardiac silhouette.  The lungs are well-inflated.  No consolidation identified.  No significant pleural effusion or discernible pneumothorax.                                       Medications   meclizine tablet 25 mg (25 mg Oral Given 8/4/23 1945)     Medical Decision Making:   Differential Diagnosis:   CVA, peripheral vertigo, orthostatic hypotension among others  Independently Interpreted Test(s):   I have ordered and independently interpreted EKG Reading(s) - see prior notes  Clinical Tests:   Lab Tests: Ordered and Reviewed  The following lab test(s) were unremarkable: CBC, CMP and Troponin  Radiological Study: Ordered and Reviewed  ED Management:  Vital signs stable.  Patient has intermittent vertiginous symptoms, with no neuro deficits on my exam, unlikely a CVA.  He was given dose of meclizine.  Lab workup was all reassuring, EKG without concerning changes and CT of the brain was within normal limits.  Re-examination, patient is feeling much better, can move his head around without having any nausea or vertiginous symptoms.  He is agreeable to home discharge, we will prescribe patient Vistaril p.r.n. the patient is to call his primary care physician for close follow up, return  precautions                          Clinical Impression:   Final diagnoses:  [R42] Dizziness  [R42] Vertigo (Primary)        ED Disposition Condition    Discharge Stable          ED Prescriptions       Medication Sig Dispense Start Date End Date Auth. Provider    meclizine (ANTIVERT) 25 mg tablet Take 1 tablet (25 mg total) by mouth 3 (three) times daily as needed for Dizziness. 20 tablet 8/4/2023 -- Ulises Wetzel MD          Follow-up Information       Follow up With Specialties Details Why Contact Info    David Milan MD Family Medicine Call in 2 days For check up [address] [facility] [address] [phone]               Ulises Wetzel MD  08/04/23 1504

## 2023-08-24 ENCOUNTER — OFFICE VISIT (OUTPATIENT)
Dept: FAMILY MEDICINE | Facility: CLINIC | Age: 68
End: 2023-08-24
Payer: MEDICARE

## 2023-08-24 VITALS
HEART RATE: 64 BPM | HEIGHT: 70 IN | BODY MASS INDEX: 23.77 KG/M2 | WEIGHT: 166 LBS | SYSTOLIC BLOOD PRESSURE: 115 MMHG | TEMPERATURE: 98 F | DIASTOLIC BLOOD PRESSURE: 72 MMHG | RESPIRATION RATE: 18 BRPM | OXYGEN SATURATION: 98 %

## 2023-08-24 DIAGNOSIS — E78.00 HYPERCHOLESTEREMIA: ICD-10-CM

## 2023-08-24 PROCEDURE — 99214 OFFICE O/P EST MOD 30 MIN: CPT | Mod: PBBFAC | Performed by: STUDENT IN AN ORGANIZED HEALTH CARE EDUCATION/TRAINING PROGRAM

## 2023-08-24 RX ORDER — ICOSAPENT ETHYL 1000 MG/1
2 CAPSULE ORAL 2 TIMES DAILY
Qty: 360 CAPSULE | Refills: 1 | Status: SHIPPED | OUTPATIENT
Start: 2023-08-24 | End: 2023-12-01 | Stop reason: SDUPTHER

## 2023-08-24 NOTE — PROGRESS NOTES
[facility] Progress Note    ID:  Rad Reyna   MRN:  [id number]     8/24/2023    Chief Complaint:  ED follow-up    History of Present Illness:  Rad Reyna is a 68 y.o. male who presents to [facility] FM clinic for       Dizziness  - room spinning sensation which lasted roughly 1 day, relieved with meclizine.  Negative stroke workup in ED.  No subsequent episodes since.  Has some left ear tenderness.  Denies any dysarthria, dysphagia, paresthesias.  Denies preceding viral illnesses, decreased p.o. intake.    Chronic issues addressed today:  GERD  - resolved per patient, not currently taking any medications    Seasonal Allergies  - p.r.n. Flonase, OTC Claritin  -no recent use, no refills indicated at this time per patient    HLD  - ASCVD risk is 8.1 percent  - Med compliant with Vescepa and Zetia  - Fasting lipid ordered prior to cards visit.  Patient states that he did go to the lab and had blood drawn though is not in the system  - Extensive discussion held per Cardiology, FLP has been ordered, Has not been performed, upcoming appointment scheduled      Chronic conditions not addressed today    Sick Sinus Syndrome  Mild Aortic/Mitral regurgitation  Biventricular pacemaker  - Continue to follow with cards  - No chest pain, palpitations, dyspnea on exertion  - 6 month interval pacer interrogation, 95% atrial pacing    HM:  PSA wnl  Shingrix/Prevnar due  [facility]. last [facility]oscopy greater than 5 years ago at [facility].  No report on file.      Health Maintenance   Topic Date Due    Hepatitis C Screening  Never done    Shingles Vaccine (1 of 2) Never done    Colorectal Cancer Screening  02/28/2023    TETANUS VACCINE  05/30/2027    Lipid Panel  02/22/2028       Social History     Tobacco Use    Smoking status: Never    Smokeless tobacco: Never   Substance Use Topics    Alcohol use: Not Currently    Drug use: Not Currently         Current Outpatient Medications:     ezetimibe (ZETIA) 10 mg tablet, Take 1 tablet  "(10 mg total) by mouth once daily., Disp: 90 tablet, Rfl: 3    icosapent ethyL (VASCEPA) 1 gram Cap, Take 2 capsules (2 g total) by mouth 2 (two) times daily., Disp: 360 capsule, Rfl: 1    meclizine (ANTIVERT) 25 mg tablet, Take 1 tablet (25 mg total) by mouth 3 (three) times daily as needed for Dizziness., Disp: 20 tablet, Rfl: 0    multivitamin-minerals-lutein Tab, Take 1 tablet by mouth once daily., Disp: , Rfl:     Review of patient's allergies indicates:   Allergen Reactions    Aspirin Other (See Comments)     Stomach burning    Atorvastatin      Other reaction(s): Stomach cramps    Penicillins      Other reaction(s): rash  Other reaction(s): rash      Nsaids (non-steroidal anti-inflammatory drug) Nausea Only         Review of Systems:  See HPI      Physical Exam:  /72 (BP Location: Right arm, Patient Position: Sitting, BP Method: Large (Automatic))   Pulse 64   Temp 97.9 °F (36.6 °C) (Oral)   Resp 18   Ht 5' 10" (1.778 m)   Wt 75.3 kg (166 lb)   SpO2 98%   BMI 23.82 kg/m²       General-well appearing elderly  male, sitting comfortably in exam chair no acute distress   HEENT- PERRLA, EOM intact, no visible nystagmus. muddy brown sclera.  Nares patent without rhinorrhea. Oropharynx clear.  Unable to visualize right tympanic membrane secondary amount of hard wax against TM.  Right TM pearly gray, landmarks intact no effusion.  CV-regular rate and rhythm, no lower extremity edema   Lungs-breathing nonlabored, lungs CTA   GI-no epigastric tenderness, abdomen soft nondistended      Assessment/Plan:    GERD  - well controlled  - attempted deescalation of therapy, DC Protonix  - continue p.r.n. Pepcid    Allergic rhinitis   - Well controlled  - Cont Flonase nasal spray, no refills indicated at this time    HLD  - doing well on Zetia/Vascepa  - refill with CPAP today  - keep scheduled follow-up with Cardiology    Dizziness   -resolved   -may use meclizine p.r.n. symptoms return  -ED " precautions discussed    HM  Needs shingrix, informed denial today  Prevnar 20 on return  Seek colonoscopy report, referal if indicated    David Milan MD  [employer] FM Resident HO-3

## 2023-09-01 NOTE — PROGRESS NOTES
I have seen the patient, reviewed the Resident's history and physical. I have personally interviewed and examined the patient at bedside and: agree with the findings.     Gen: BM in NAD  CV: RRR, positive AS murmur

## 2023-09-12 ENCOUNTER — CLINICAL SUPPORT (OUTPATIENT)
Dept: CARDIOLOGY | Facility: CLINIC | Age: 68
End: 2023-09-12

## 2023-09-12 DIAGNOSIS — Z95.0 S/P PLACEMENT OF CARDIAC PACEMAKER: ICD-10-CM

## 2023-09-12 DIAGNOSIS — I49.5 SICK SINUS SYNDROME: Primary | ICD-10-CM

## 2023-09-12 PROCEDURE — 99211 OFF/OP EST MAY X REQ PHY/QHP: CPT | Mod: PBBFAC

## 2023-09-12 PROCEDURE — 93280 PM DEVICE PROGR EVAL DUAL: CPT | Mod: PBBFAC | Performed by: INTERNAL MEDICINE

## 2023-10-10 ENCOUNTER — OFFICE VISIT (OUTPATIENT)
Dept: CARDIOLOGY | Facility: CLINIC | Age: 68
End: 2023-10-10
Payer: COMMERCIAL

## 2023-10-10 VITALS
DIASTOLIC BLOOD PRESSURE: 88 MMHG | HEIGHT: 70 IN | RESPIRATION RATE: 20 BRPM | HEART RATE: 67 BPM | TEMPERATURE: 98 F | SYSTOLIC BLOOD PRESSURE: 132 MMHG | BODY MASS INDEX: 23.6 KG/M2 | WEIGHT: 164.81 LBS | OXYGEN SATURATION: 100 %

## 2023-10-10 DIAGNOSIS — I49.5 SICK SINUS SYNDROME: Primary | ICD-10-CM

## 2023-10-10 DIAGNOSIS — I38 VALVULAR HEART DISEASE: ICD-10-CM

## 2023-10-10 DIAGNOSIS — E78.00 HYPERCHOLESTEREMIA: ICD-10-CM

## 2023-10-10 PROCEDURE — 99214 OFFICE O/P EST MOD 30 MIN: CPT | Mod: PBBFAC | Performed by: INTERNAL MEDICINE

## 2023-10-10 NOTE — PROGRESS NOTES
10/10/2023 10:30 AM    Subjective:     CHIEF COMPLAINT:   Chief Complaint   Patient presents with    f/u denies chest pain or sob since LV no question                           HPI:    Mr. Rad Reyna is a 68 y.o. malewith SSS s/p Medtronic dual chamber PPM 7/2021, mild AI, mild MR, and HLD, who presents to cardiology clinic for follow up.       Patient with no acute events or complaints.  He reports he bikes 12 miles twice weekly without any symptoms.  He denies anginal symptoms with exertion or dyspnea on exertion or decreased exercise capacity.  He denies symptoms of heart failure such as orthopnea, PND, lower extremity edema, abdominal distention, or dyspnea on exertion.  He reports he had a device interrogation last month but I am unable to locate in chart.  His previous device interrogation demonstrated a baseline rhythm of sinus bradycardia with a heart rate of 40 and 95% atrial pacing.    Lab Results   Component Value Date    CREATININE 1.01 08/04/2023    CREATININE 1.24 (H) 06/08/2023    CREATININE 1.05 08/02/2022     Past Medical History    Patient Active Problem List   Diagnosis    Allergic rhinitis    Gastroesophageal reflux disease    Sick sinus syndrome    S/P placement of cardiac pacemaker    Hypercholesteremia    History of DVT (deep vein thrombosis)    Valvular heart disease    Mild aortic regurgitation    Mild mitral regurgitation by prior echocardiogram       Surgical History    Past Surgical History:   Procedure Laterality Date    INSERT / REPLACE / REMOVE PACEMAKER         Social History     Socioeconomic History    Marital status: Single   Tobacco Use    Smoking status: Never    Smokeless tobacco: Never   Substance and Sexual Activity    Alcohol use: Not Currently    Drug use: Not Currently       Family History   Problem Relation Age of Onset    Heart attack Mother     Hypertension Father      Review of patient's allergies indicates:   Allergen Reactions    Aspirin Other (See Comments)  "    Stomach burning    Atorvastatin      Other reaction(s): Stomach cramps    Penicillins      Other reaction(s): rash  Other reaction(s): rash      Nsaids (non-steroidal anti-inflammatory drug) Nausea Only       Current Medications    Current Outpatient Medications   Medication Instructions    ezetimibe (ZETIA) 10 mg, Oral, Daily    icosapent ethyL (VASCEPA) 2 g, Oral, 2 times daily    meclizine (ANTIVERT) 25 mg, Oral, 3 times daily PRN    multivitamin-minerals-lutein Tab 1 tablet, Oral, Daily         ROS:   Denies headaches, changes in vision, nausea, vomiting, fever, chills, chest pain, palpitations, dyspnea, abdominal pain, changes in urinary or bowel habits.    Objective:     BP Readings from Last 3 Encounters:   10/10/23 132/88   08/24/23 115/72   08/04/23 124/86        Pulse Readings from Last 3 Encounters:   10/10/23 67   08/24/23 64   08/04/23 60        Temp Readings from Last 3 Encounters:   10/10/23 98.4 °F (36.9 °C) (Oral)   08/24/23 97.9 °F (36.6 °C) (Oral)   08/04/23 97.8 °F (36.6 °C) (Oral)       Wt Readings from Last 3 Encounters:   10/10/23 74.8 kg (164 lb 12.8 oz)   08/24/23 75.3 kg (166 lb)   08/04/23 73.4 kg (161 lb 13.1 oz)         PE:  Blood pressure 132/88, pulse 67, temperature 98.4 °F (36.9 °C), temperature source Oral, resp. rate 20, height 5' 10" (1.778 m), weight 74.8 kg (164 lb 12.8 oz), SpO2 100 %.   Physical Exam  Vitals reviewed.   Constitutional:       General: He is not in acute distress.     Appearance: Normal appearance. He is normal weight. He is not ill-appearing.   HENT:      Head: Normocephalic and atraumatic.      Right Ear: External ear normal.      Left Ear: External ear normal.      Nose: Nose normal.      Mouth/Throat:      Mouth: Mucous membranes are moist.   Eyes:      Conjunctiva/sclera: Conjunctivae normal.   Cardiovascular:      Rate and Rhythm: Normal rate and regular rhythm.      Pulses: Normal pulses.      Heart sounds: Murmur heard.   Pulmonary:      Effort: " Pulmonary effort is normal. No respiratory distress.      Breath sounds: Normal breath sounds. No stridor. No wheezing, rhonchi or rales.   Chest:      Chest wall: No tenderness.   Abdominal:      General: Abdomen is flat. Bowel sounds are normal. There is no distension.      Palpations: Abdomen is soft.   Musculoskeletal:      Cervical back: Neck supple.      Right lower leg: No edema.      Left lower leg: No edema.   Skin:     General: Skin is warm and dry.      Capillary Refill: Capillary refill takes less than 2 seconds.   Neurological:      Mental Status: He is alert and oriented to person, place, and time.   Psychiatric:         Mood and Affect: Mood normal.         Behavior: Behavior normal.                                                                                                                                                                                                                                                                                                                                                                                         CARDIAC TESTING:  Echocardiogram  No results found for this or any previous visit.      Stress Test  No results found for this or any previous visit.     Coronary Angiogram  No results found for this or any previous visit.     Holter Monitor  No cardiac monitor results found for the past 12 months    Last BMP BMP  Lab Results   Component Value Date     08/04/2023    K 3.7 08/04/2023    CO2 29 08/04/2023    BUN 11.8 08/04/2023    CREATININE 1.01 08/04/2023    CALCIUM 9.1 08/04/2023    EGFRNORACEVR >60 08/04/2023      Last CBC     Lab Results   Component Value Date    WBC 4.25 (L) 08/04/2023    HGB 13.2 (L) 08/04/2023    HCT 41.9 (L) 08/04/2023    MCV 84.8 08/04/2023     08/04/2023           BNP    Lab Results   Component Value Date    BNP 23.7 04/28/2021    BNP 43.2 10/07/2017     Last lipids    Lab Results   Component Value Date    CHOL 220  (H) 02/22/2023    CHOL 191 05/21/2021    CHOL 198 01/15/2021    HDL 64 (H) 02/22/2023    HDL 54 05/21/2021    HDL 63 (H) 01/15/2021    .00 (H) 02/22/2023    .00 05/21/2021    .00 01/15/2021    TRIG 63 02/22/2023    TRIG 70 05/21/2021    TRIG 63 01/15/2021    TOTALCHOLEST 3 02/22/2023    TOTALCHOLEST 4 05/21/2021    TOTALCHOLEST 3 01/15/2021      LFT     Lab Results   Component Value Date    ALT 22 08/04/2023    ALT 17 06/08/2023    ALT 17 08/02/2022    AST 22 08/04/2023    AST 22 06/08/2023    AST 21 08/02/2022         Plan:   67 y.o. male with a PMH significant for sick sinus syndrome s/p Medtronic dual chamber PPM 7/2021, mild AI, mild MR, and HLD, who presents to cardiology clinic for follow up.      Sick Sinus Syndrome  -Dual-chamber PPM on 7.21.21 (Medtronic)  -Last device interrogation 3.14.23 showed 95.3% A pacing, no V pacing. Episodes of AHR noted with HR 200s, longest lasting [id number]. No Afib.  Episodes of NS-VHRs all 1-2 sec. NO new device interrogation for review. Needs device interrogation but not pre-syncope or syncope.     -Continue device interrogation q 6 months, next due 9/2023 (see no interrogation in chart that is scanned in)    Valvular Heart Disease  - EF 55 to 60%, mild mitral regurgitation, mild aortic regurgitation, trace tricuspid regurgitation with RVSP 38 and trace pulmonic regurgitation. (TTE 4.14.21)  - Moderate MR -stable (previously noted on TTE 9/2017), Trace TR, mild AR, (TTE 3.11.20)  - pt will need serial echocardiograms q3-5 years  - Denies SOB or RAO     Hypercholesterolemia  - FLP 2.2023: Cholesterol 220 from 191,  from 123  -Has not completed repeat lipid panel. Does not want to start statin. Will follow up lipid panel and re-discuss.   - Zetia 10mg daily    Damien Guajardo MD  [employer] [facility] Fellow

## 2023-10-20 ENCOUNTER — LAB VISIT (OUTPATIENT)
Dept: LAB | Facility: HOSPITAL | Age: 68
End: 2023-10-20
Attending: STUDENT IN AN ORGANIZED HEALTH CARE EDUCATION/TRAINING PROGRAM
Payer: COMMERCIAL

## 2023-10-20 DIAGNOSIS — E78.00 HYPERCHOLESTEREMIA: ICD-10-CM

## 2023-10-20 LAB
CHOLEST SERPL-MCNC: 180 MG/DL
CHOLEST/HDLC SERPL: 3 {RATIO} (ref 0–5)
HDLC SERPL-MCNC: 56 MG/DL (ref 35–60)
LDLC SERPL CALC-MCNC: 115 MG/DL (ref 50–140)
TRIGL SERPL-MCNC: 44 MG/DL (ref 34–140)
VLDLC SERPL CALC-MCNC: 9 MG/DL

## 2023-10-20 PROCEDURE — 80061 LIPID PANEL: CPT

## 2023-10-20 PROCEDURE — 36415 COLL VENOUS BLD VENIPUNCTURE: CPT

## 2023-11-07 ENCOUNTER — HOSPITAL ENCOUNTER (EMERGENCY)
Facility: HOSPITAL | Age: 68
Discharge: HOME | End: 2023-11-07
Attending: STUDENT IN AN ORGANIZED HEALTH CARE EDUCATION/TRAINING PROGRAM
Payer: COMMERCIAL

## 2023-11-07 VITALS
SYSTOLIC BLOOD PRESSURE: 118 MMHG | OXYGEN SATURATION: 90 % | HEART RATE: 63 BPM | RESPIRATION RATE: 21 BRPM | BODY MASS INDEX: 23.39 KG/M2 | DIASTOLIC BLOOD PRESSURE: 76 MMHG | WEIGHT: 163.38 LBS | TEMPERATURE: 98 F | HEIGHT: 70 IN

## 2023-11-07 DIAGNOSIS — R55 SYNCOPE, UNSPECIFIED SYNCOPE TYPE: Primary | ICD-10-CM

## 2023-11-07 DIAGNOSIS — N17.9 AKI (ACUTE KIDNEY INJURY): ICD-10-CM

## 2023-11-07 DIAGNOSIS — R07.9 CHEST PAIN: ICD-10-CM

## 2023-11-07 LAB
ALBUMIN SERPL-MCNC: 3.8 G/DL (ref 3.4–4.8)
ALBUMIN/GLOB SERPL: 1.2 RATIO (ref 1.1–2)
ALP SERPL-CCNC: 59 UNIT/L (ref 40–150)
ALT SERPL-CCNC: 20 UNIT/L (ref 0–55)
ANION GAP SERPL CALC-SCNC: 7 MEQ/L
AST SERPL-CCNC: 27 UNIT/L (ref 5–34)
BASOPHILS # BLD AUTO: 0.07 X10(3)/MCL
BASOPHILS NFR BLD AUTO: 0.9 %
BILIRUB SERPL-MCNC: 0.6 MG/DL
BUN SERPL-MCNC: 16.8 MG/DL (ref 8.4–25.7)
BUN SERPL-MCNC: 19.1 MG/DL (ref 8.4–25.7)
CALCIUM SERPL-MCNC: 8.6 MG/DL (ref 8.8–10)
CALCIUM SERPL-MCNC: 9.7 MG/DL (ref 8.8–10)
CHLORIDE SERPL-SCNC: 105 MMOL/L (ref 98–107)
CHLORIDE SERPL-SCNC: 108 MMOL/L (ref 98–107)
CO2 SERPL-SCNC: 23 MMOL/L (ref 23–31)
CO2 SERPL-SCNC: 25 MMOL/L (ref 23–31)
CREAT SERPL-MCNC: 1.51 MG/DL (ref 0.73–1.18)
CREAT SERPL-MCNC: 2.05 MG/DL (ref 0.73–1.18)
CREAT/UREA NIT SERPL: 11
EOSINOPHIL # BLD AUTO: 0.08 X10(3)/MCL (ref 0–0.9)
EOSINOPHIL NFR BLD AUTO: 1 %
ERYTHROCYTE [DISTWIDTH] IN BLOOD BY AUTOMATED COUNT: 14.4 % (ref 11.5–17)
GFR SERPLBLD CREATININE-BSD FMLA CKD-EPI: 35 MLS/MIN/1.73/M2
GFR SERPLBLD CREATININE-BSD FMLA CKD-EPI: 51 MLS/MIN/1.73/M2
GLOBULIN SER-MCNC: 3.2 GM/DL (ref 2.4–3.5)
GLUCOSE SERPL-MCNC: 82 MG/DL (ref 82–115)
GLUCOSE SERPL-MCNC: 99 MG/DL (ref 82–115)
HCT VFR BLD AUTO: 39.5 % (ref 42–52)
HGB BLD-MCNC: 12.4 G/DL (ref 14–18)
HOLD SPECIMEN: NORMAL
IMM GRANULOCYTES # BLD AUTO: 0.02 X10(3)/MCL (ref 0–0.04)
IMM GRANULOCYTES NFR BLD AUTO: 0.3 %
INR PPP: 1.1
LYMPHOCYTES # BLD AUTO: 2.09 X10(3)/MCL (ref 0.6–4.6)
LYMPHOCYTES NFR BLD AUTO: 27.2 %
MAGNESIUM SERPL-MCNC: 2.2 MG/DL (ref 1.6–2.6)
MCH RBC QN AUTO: 26.6 PG (ref 27–31)
MCHC RBC AUTO-ENTMCNC: 31.4 G/DL (ref 33–36)
MCV RBC AUTO: 84.8 FL (ref 80–94)
MONOCYTES # BLD AUTO: 0.79 X10(3)/MCL (ref 0.1–1.3)
MONOCYTES NFR BLD AUTO: 10.3 %
NEUTROPHILS # BLD AUTO: 4.63 X10(3)/MCL (ref 2.1–9.2)
NEUTROPHILS NFR BLD AUTO: 60.3 %
NRBC BLD AUTO-RTO: 0 %
PLATELET # BLD AUTO: 169 X10(3)/MCL (ref 130–400)
PMV BLD AUTO: 9.5 FL (ref 7.4–10.4)
POTASSIUM SERPL-SCNC: 3.9 MMOL/L (ref 3.5–5.1)
POTASSIUM SERPL-SCNC: 4.4 MMOL/L (ref 3.5–5.1)
PROT SERPL-MCNC: 7 GM/DL (ref 5.8–7.6)
PROTHROMBIN TIME: 14.7 SECONDS (ref 11.4–14)
RBC # BLD AUTO: 4.66 X10(6)/MCL (ref 4.7–6.1)
SODIUM SERPL-SCNC: 138 MMOL/L (ref 136–145)
SODIUM SERPL-SCNC: 139 MMOL/L (ref 136–145)
TROPONIN I SERPL-MCNC: <0.01 NG/ML (ref 0–0.04)
WBC # SPEC AUTO: 7.68 X10(3)/MCL (ref 4.5–11.5)

## 2023-11-07 PROCEDURE — 25000003 PHARM REV CODE 250: Performed by: STUDENT IN AN ORGANIZED HEALTH CARE EDUCATION/TRAINING PROGRAM

## 2023-11-07 PROCEDURE — 93005 ELECTROCARDIOGRAM TRACING: CPT

## 2023-11-07 PROCEDURE — 85025 COMPLETE CBC W/AUTO DIFF WBC: CPT | Performed by: STUDENT IN AN ORGANIZED HEALTH CARE EDUCATION/TRAINING PROGRAM

## 2023-11-07 PROCEDURE — 83735 ASSAY OF MAGNESIUM: CPT | Performed by: STUDENT IN AN ORGANIZED HEALTH CARE EDUCATION/TRAINING PROGRAM

## 2023-11-07 PROCEDURE — 84484 ASSAY OF TROPONIN QUANT: CPT | Performed by: STUDENT IN AN ORGANIZED HEALTH CARE EDUCATION/TRAINING PROGRAM

## 2023-11-07 PROCEDURE — 96361 HYDRATE IV INFUSION ADD-ON: CPT

## 2023-11-07 PROCEDURE — 99285 EMERGENCY DEPT VISIT HI MDM: CPT | Mod: 25

## 2023-11-07 PROCEDURE — 85610 PROTHROMBIN TIME: CPT | Performed by: STUDENT IN AN ORGANIZED HEALTH CARE EDUCATION/TRAINING PROGRAM

## 2023-11-07 PROCEDURE — 96360 HYDRATION IV INFUSION INIT: CPT

## 2023-11-07 PROCEDURE — 80053 COMPREHEN METABOLIC PANEL: CPT | Performed by: STUDENT IN AN ORGANIZED HEALTH CARE EDUCATION/TRAINING PROGRAM

## 2023-11-07 RX ADMIN — SODIUM CHLORIDE 1000 ML: 9 INJECTION, SOLUTION INTRAVENOUS at 03:11

## 2023-11-07 RX ADMIN — SODIUM CHLORIDE 1000 ML: 9 INJECTION, SOLUTION INTRAVENOUS at 04:11

## 2023-11-07 NOTE — ED PROVIDER NOTES
Encounter Date: 11/7/2023       History     Chief Complaint   Patient presents with    AICD Problem     Pt in after reporting AICD defibrillating him after doing yard work today.     Patient presents to the emergency department after a brief syncopal episode.  Has a history of a pacemaker for sick sinus syndrome.  He was out working outside today not eating or drinking, sat down when he was started to get sweaty he had blurry vision and per bystander lost consciousness for a brief 2nd.  No fall or head injury, he was caught.  Here in the ER he states he feels fine just fatigued.  No chest pain or shortness of breath.  There was some report by EMS that his defibrillator may have cardioverted him, the patient denies any chest pain or feelings of electric shock.    The history is provided by the patient.     Review of patient's allergies indicates:   Allergen Reactions    Aspirin Other (See Comments)     Stomach burning    Atorvastatin      Other reaction(s): Stomach cramps    Penicillins      Other reaction(s): rash  Other reaction(s): rash      Nsaids (non-steroidal anti-inflammatory drug) Nausea Only     Past Medical History:   Diagnosis Date    Atrial fibrillation     Hyperlipidemia     Hypertension      Past Surgical History:   Procedure Laterality Date    INSERT / REPLACE / REMOVE PACEMAKER       Family History   Problem Relation Age of Onset    Heart attack Mother     Hypertension Father      Social History     Tobacco Use    Smoking status: Never    Smokeless tobacco: Never   Substance Use Topics    Alcohol use: Not Currently    Drug use: Not Currently     Review of Systems   Constitutional:  Negative for chills and fever.   HENT:  Negative for congestion and sore throat.    Respiratory:  Negative for cough and shortness of breath.    Cardiovascular:  Negative for chest pain and palpitations.   Gastrointestinal:  Negative for abdominal pain and nausea.   Genitourinary:  Negative for dysuria and hematuria.    Musculoskeletal:  Negative for arthralgias and myalgias.   Neurological:  Positive for syncope. Negative for dizziness and weakness.       Physical Exam     Initial Vitals [11/07/23 1500]   BP Pulse Resp Temp SpO2   103/77 62 12 98.2 °F (36.8 °C) 98 %      MAP       --         Physical Exam    Nursing note and vitals reviewed.  Constitutional: He appears well-developed and well-nourished.   HENT:   Head: Normocephalic and atraumatic.   Eyes: Conjunctivae are normal. Pupils are equal, round, and reactive to light.   Neck: Neck supple.   Normal range of motion.  Cardiovascular:  Normal rate, regular rhythm and normal heart sounds.           Pulmonary/Chest: Breath sounds normal. No respiratory distress.   Abdominal: Abdomen is soft. There is no abdominal tenderness.   Musculoskeletal:         General: No edema. Normal range of motion.      Cervical back: Normal range of motion and neck supple.     Neurological: He is alert and oriented to person, place, and time.   Skin: Skin is warm and dry.         ED Course   Procedures  Labs Reviewed   COMPREHENSIVE METABOLIC PANEL - Abnormal; Notable for the following components:       Result Value    Creatinine 2.05 (*)     All other components within normal limits   PROTIME-INR - Abnormal; Notable for the following components:    PT 14.7 (*)     All other components within normal limits   CBC WITH DIFFERENTIAL - Abnormal; Notable for the following components:    RBC 4.66 (*)     Hgb 12.4 (*)     Hct 39.5 (*)     MCH 26.6 (*)     MCHC 31.4 (*)     All other components within normal limits   BASIC METABOLIC PANEL - Abnormal; Notable for the following components:    Chloride 108 (*)     Creatinine 1.51 (*)     Calcium Level Total 8.6 (*)     All other components within normal limits   TROPONIN I - Normal   MAGNESIUM - Normal   CBC W/ AUTO DIFFERENTIAL    Narrative:     The following orders were created for panel order CBC auto differential.  Procedure                                Abnormality         Status                     ---------                               -----------         ------                     CBC with Differential[[id number]]        Abnormal            Final result                 Please view results for these tests on the individual orders.   EXTRA TUBES    Narrative:     The following orders were created for panel order EXTRA TUBES.  Procedure                               Abnormality         Status                     ---------                               -----------         ------                     Gold Top Hold[[id number]]                                    Final result                 Please view results for these tests on the individual orders.   GOLD TOP HOLD     EKG Readings: (Independently Interpreted)   Initial Reading: No STEMI. Rhythm: Paced Rhythm. Heart Rate: 61. Ectopy: No Ectopy. Conduction: Normal. Axis: Left Axis Deviation.     ECG Results              EKG 12-lead (Final result)  Result time 11/07/23 16:11:53      Final result by Interface, Lab In [facility] (11/07/23 16:11:53)                   Narrative:    Test Reason : R07.9,    Vent. Rate : 061 BPM     Atrial Rate : 061 BPM     P-R Int : 200 ms          QRS Dur : 086 ms      QT Int : 424 ms       P-R-T Axes : -21 -42 042 degrees     QTc Int : 426 ms    Atrial-paced rhythm  Left axis deviation  Abnormal ECG  When compared with ECG of 04-AUG-2023 18:43,  No significant change was found  Confirmed by Donis Davila MD ([id number]) on 11/7/2023 4:11:46 PM    Referred By: [email or web address]   SELF           Confirmed By:Donis Davila MD                                     EKG 12-LEAD (Final result)  Result time 11/15/23 12:12:40      Final result by Unknown User (11/15/23 12:12:40)                                      Imaging Results              X-Ray Chest AP Portable (Final result)  Result time 11/07/23 15:25:33      Final result by Stoney Viramontes MD (11/07/23 15:25:33)                   Impression:      No  acute chest disease is identified.      Electronically signed by: Stoney Viramontes  Date:    11/07/2023  Time:    15:25               Narrative:    EXAMINATION:  XR CHEST AP PORTABLE    CLINICAL HISTORY:  Chest pain;, .    COMPARISON:  August 4, 2023    FINDINGS:  No alveolar consolidation, effusion, or pneumothorax is seen.   The thoracic aorta is normal  cardiac silhouette, central pulmonary vessels and mediastinum are normal in size and are grossly unremarkable.   visualized osseous structures are grossly unremarkable.                                       Medications   sodium chloride 0.9% bolus 1,000 mL 1,000 mL (0 mLs Intravenous Stopped 11/7/23 1621)   sodium chloride 0.9% bolus 1,000 mL 1,000 mL (0 mLs Intravenous Stopped 11/7/23 1745)     Medical Decision Making  Hypotension, blood pressure is 90/60.  Otherwise vital signs are stable.  He was started on IV fluids.  EKGs shows paced rhythm.  He was pacemaker was interrogated, no severe events, no evidence of prolonged ventricular tachycardia and no shocks delivered.  CBC was unremarkable, CMP does show a significantly elevated creatinine of 2 doubled from his baseline.  Chest x-ray is generally clear.  Syncope likely secondary to dehydration and overexertion.  He was ordered a 2 L of fluids and family Medicine was called to evaluate the patient for admission.  After discussion between myself with the patient and family Medicine Service, patient did not want to be admitted.  I do not think this is unreasonable at this time given that we have a cause for his syncope.  We will repeat a BMP prior to discharge, as long as creatinine is improving okay to follow up with family Medicine closely.    Amount and/or Complexity of Data Reviewed  Labs: ordered. Decision-making details documented in ED Course.  Radiology: ordered. Decision-making details documented in ED Course.  ECG/medicine tests: ordered and independent interpretation performed. Decision-making details  documented in ED Course.    Risk  Decision regarding hospitalization.               ED Course as of 12/20/23 2128 Tue Nov 07, 2023   1831 Creatinine(!): 1.51  Repeat Cr coming down, will discharge pt home [TT]      ED Course User Index  [TT] Jim Bueno PA                    Clinical Impression:   Final diagnoses:  [R07.9] Chest pain  [R55] Syncope, unspecified syncope type (Primary)  [N17.9] IVA (acute kidney injury)        ED Disposition Condition    Discharge Stable            ED Prescriptions    None       Follow-up Information       Follow up With Specialties Details Why Contact Info    David Milan MD Family Medicine Schedule an appointment as soon as possible for a visit in 3 days  [address] [facility] [address]  [phone]      [facility] Dept Emergency Medicine In 3 days As needed, If symptoms worsen [facility] [facility] [id number]  [phone]             Ulises Wetzel MD  11/07/23 1649       Ulises Wetzel MD  12/20/23 2128

## 2023-11-07 NOTE — CONSULTS
"[facility] Consult Note    Attending Physician: Balbina Hickman MD  Resident: Iona Fenton MD      Date of Admit: 11/7/2023    Chief Complaint     Lightheadedness, blurry vision    Subjective:      History of Present Illness:  Rad Reyna is a 68 y.o. male who with a history of sick sinus syndrome s/p PPM 7/2021, mild AR, mild MR, HLD who presented to [facility] ED on 11/7/2023  with a primary complaint of lightheadedness, transient blurry vision, sweating this afternoon. Patient reports he was outside doing yard work at approximately 0930 for about 2-3hours. He had nothing to eat or drink during this time. He took mall his morning medications. Upon completion of his tasks, he sat down to chat with a friend, and 20 minutes into the the conversation he began "feeling bad." He described presyncope symptoms of aforementioned, but never lost sonsciousness. No chest pain, palpitations, shortness of breath, nausea, syncope, electric shock from his defibrillator. He currently reports all his symptoms resolved in the ED with administration of IVFs. On initial presentation, found to be mildly hypotensive 90s/50s. Work up notable for Cr 2.05 (baseline 1.01). Family Medicine consulted for IVA.      Past Medical History:  Past Medical History:   Diagnosis Date    Atrial fibrillation     Hyperlipidemia     Hypertension        Past Surgical History:  Past Surgical History:   Procedure Laterality Date    INSERT / REPLACE / REMOVE PACEMAKER         Family History:  Family History   Problem Relation Age of Onset    Heart attack Mother     Hypertension Father        Social History:  Social History     Tobacco Use    Smoking status: Never    Smokeless tobacco: Never   Substance Use Topics    Alcohol use: Not Currently    Drug use: Not Currently       Allergies:  Review of patient's allergies indicates:   Allergen Reactions    Aspirin Other (See Comments)     Stomach burning    Atorvastatin      Other reaction(s): Stomach " "cramps    Penicillins      Other reaction(s): rash  Other reaction(s): rash      Nsaids (non-steroidal anti-inflammatory drug) Nausea Only       Home Medications:  Prior to Admission medications    Medication Sig Start Date End Date Taking? Authorizing Provider   ezetimibe (ZETIA) 10 mg tablet Take 1 tablet (10 mg total) by mouth once daily. 4/11/23 4/10/24  Margarita Enamorado MD   icosapent ethyL (VASCEPA) 1 gram Cap Take 2 capsules (2 g total) by mouth 2 (two) times daily. 23  David Milan MD   meclizine (ANTIVERT) 25 mg tablet Take 1 tablet (25 mg total) by mouth 3 (three) times daily as needed for Dizziness. 23   Ulises Wetzel MD   multivitamin-minerals-lutein Tab Take 1 tablet by mouth once daily. 21   Provider, Historical         Review of Systems:  Constitutional: No fever, chills  HENT: Denies headaches  Cardiovascular:per HPI  Respiratory: Denies cough, pain with breathing  Abdominal: Denies diarrhea, constipation, abdominal pain  : Denies dysuria, urinary frequency, urinary hesitancy, foul-smelling urine  MSK: Denies weakness, pain  Skin: Denies rashes, trauma  Neuro: Denies numbness, tingling, focal deficits  Heme: Denies bleeding in urine, stool, from any other sources         Objective:   Last 24 Hour Vital Signs:  BP  Min: 103/77  Max: 103/77  Temp  Av.2 °F (36.8 °C)  Min: 98.2 °F (36.8 °C)  Max: 98.2 °F (36.8 °C)  Pulse  Av  Min: 62  Max: 62  Resp  Av  Min: 12  Max: 12  SpO2  Av %  Min: 98 %  Max: 98 %  Height  Av' 10" (177.8 cm)  Min: 5' 10" (177.8 cm)  Max: 5' 10" (177.8 cm)  Weight  Av.1 kg (163 lb 5.8 oz)  Min: 74.1 kg (163 lb 5.8 oz)  Max: 74.1 kg (163 lb 5.8 oz)  Body mass index is 23.44 kg/m².  No intake/output data recorded.    Physical Examination:  General: Alert and oriented, no acute distress.   HEENT: Normocephalic, dry mucous membranes. pupils are equal, round and reactive to light, extraocular movements are intact, anicteric sclera. "   Neck: Supple, non-tender  Respiratory: Lungs are clear to auscultation, respirations are non-labored, breath sounds are equal, symmetrical chest wall expansion.   Cardiovascular: Normal rate, regular rhythm, S1-S2, murmur present, no edema, brisk capillary refill.   Gastrointestinal: soft, non-tender, non-distended, normoactive bowel sounds.   Musculoskeletal: Symmetric movement of all extremities with no weakness appreciated  Integumentary: Warm, dry, intact.   Neurologic: No focal deficits; Cranial Nerves II-XII are grossly intact.   Cognition and Speech: Oriented, Speech clear and coherent.   Psychiatric: Cooperative, Appropriate mood & affect.       Laboratory:  Most Recent Data:  CBC:   Lab Results   Component Value Date    WBC 7.68 11/07/2023    HGB 12.4 (L) 11/07/2023    HCT 39.5 (L) 11/07/2023     11/07/2023    MCV 84.8 11/07/2023    RDW 14.4 11/07/2023     WBC Differential:   Recent Labs   Lab 11/07/23  1511   WBC 7.68   HGB 12.4*   HCT 39.5*      MCV 84.8         DM:   Lab Results   Component Value Date    HGBA1C 5.4 01/15/2021    HGBA1C 5.6 05/17/2019    HGBA1C 5.4 05/30/2017    CREATININE 2.05 (H) 11/07/2023       Cardiac:   Lab Results   Component Value Date    TROPONINI <0.010 11/07/2023    BNP 23.7 04/28/2021       Trended Lab Data:  Recent Labs   Lab 11/07/23  1511   WBC 7.68   HGB 12.4*   HCT 39.5*      MCV 84.8   RDW 14.4      K 4.4   CO2 25   BUN 19.1   CREATININE 2.05*   ALBUMIN 3.8   BILITOT 0.6   AST 27   ALKPHOS 59   ALT 20       Trended Cardiac Data:  Recent Labs   Lab 11/07/23  1512   TROPONINI <0.010       Microbiology Data:  Microbiology Results (last 7 days)       ** No results found for the last 168 hours. **           Results for orders placed or performed during the hospital encounter of 11/07/23   EKG 12-lead    Narrative    Test Reason : R07.9,    Vent. Rate : 061 BPM     Atrial Rate : 061 BPM     P-R Int : 200 ms          QRS Dur : 086 ms      QT Int :  424 ms       P-R-T Axes : -21 -42 042 degrees     QTc Int : 426 ms    Atrial-paced rhythm  Left axis deviation  Abnormal ECG  When compared with ECG of 04-AUG-2023 18:43,  No significant change was found  Confirmed by Donis Davila MD ([id number]) on 11/7/2023 4:11:46 PM    Referred By: CHRISTELLE   SELF           Confirmed By:Donis Davila MD         Radiology:  Imaging Results              X-Ray Chest AP Portable (Final result)  Result time 11/07/23 15:25:33      Final result by Stoney Viramontes MD (11/07/23 15:25:33)                   Impression:      No acute chest disease is identified.      Electronically signed by: Stoney Viramontes  Date:    11/07/2023  Time:    15:25               Narrative:    EXAMINATION:  XR CHEST AP PORTABLE    CLINICAL HISTORY:  Chest pain;, .    COMPARISON:  August 4, 2023    FINDINGS:  No alveolar consolidation, effusion, or pneumothorax is seen.   The thoracic aorta is normal  cardiac silhouette, central pulmonary vessels and mediastinum are normal in size and are grossly unremarkable.   visualized osseous structures are grossly unremarkable.                                           Assessment & Plan:     IVA  Presyncope  -Likely due to dehydration with reported decreased po intake today  -Received 2L NS bolus in the ED with resolution of presenting symptoms  -Device interrogation in the ED revealed no acute events   -Patient offered admission and declined  -Anticipate renal function to improve with fluid resuscitation  -Reviewed medication list, no nephrotoxic agents   -Recommend rechecking BMP for downward trend of Cr  -Patient is hemodynamically stable for discharge, will follow up in [facility] later this week to monitor renal functions  -Recommended staying adequately hydrated with at least 2L fluid intake daily  -ED return precautions discussed        Case discussed with Dr. Vick Fenton MD  [facility] -III

## 2023-11-08 NOTE — PROVIDER PROGRESS NOTES - EMERGENCY DEPT.
Encounter Date: 11/7/2023    ED Physician Progress Notes       Pt transitioned to me at 1700, will order repeat BMP after IVF have finished, 1830 Cr is now 1.5, down from 2.0 will discharge pt at this time with close PCP follow up

## 2023-12-01 ENCOUNTER — OFFICE VISIT (OUTPATIENT)
Dept: FAMILY MEDICINE | Facility: CLINIC | Age: 68
End: 2023-12-01
Payer: COMMERCIAL

## 2023-12-01 VITALS
DIASTOLIC BLOOD PRESSURE: 63 MMHG | WEIGHT: 170.38 LBS | BODY MASS INDEX: 24.39 KG/M2 | HEART RATE: 66 BPM | SYSTOLIC BLOOD PRESSURE: 103 MMHG | HEIGHT: 70 IN | OXYGEN SATURATION: 99 % | RESPIRATION RATE: 20 BRPM | TEMPERATURE: 98 F

## 2023-12-01 DIAGNOSIS — N17.9 AKI (ACUTE KIDNEY INJURY): ICD-10-CM

## 2023-12-01 DIAGNOSIS — Z12.11 ENCOUNTER FOR COLORECTAL CANCER SCREENING: ICD-10-CM

## 2023-12-01 DIAGNOSIS — Z13.6 ENCOUNTER FOR ABDOMINAL AORTIC ANEURYSM (AAA) SCREENING: ICD-10-CM

## 2023-12-01 DIAGNOSIS — Z12.11 COLON CANCER SCREENING: ICD-10-CM

## 2023-12-01 DIAGNOSIS — E78.00 HYPERCHOLESTEREMIA: Primary | ICD-10-CM

## 2023-12-01 DIAGNOSIS — Z12.12 ENCOUNTER FOR COLORECTAL CANCER SCREENING: ICD-10-CM

## 2023-12-01 DIAGNOSIS — Z23 IMMUNIZATION DUE: ICD-10-CM

## 2023-12-01 PROCEDURE — G0009 ADMIN PNEUMOCOCCAL VACCINE: HCPCS | Mod: PBBFAC

## 2023-12-01 PROCEDURE — 99214 OFFICE O/P EST MOD 30 MIN: CPT | Mod: PBBFAC,25 | Performed by: STUDENT IN AN ORGANIZED HEALTH CARE EDUCATION/TRAINING PROGRAM

## 2023-12-01 PROCEDURE — 90677 PCV20 VACCINE IM: CPT | Mod: PBBFAC

## 2023-12-01 RX ORDER — ICOSAPENT ETHYL 1000 MG/1
2 CAPSULE ORAL 2 TIMES DAILY
Qty: 360 CAPSULE | Refills: 1 | Status: SHIPPED | OUTPATIENT
Start: 2024-02-21 | End: 2024-02-12

## 2023-12-01 RX ADMIN — PNEUMOCOCCAL 20-VALENT CONJUGATE VACCINE 0.5 ML
2.2; 2.2; 2.2; 2.2; 2.2; 2.2; 2.2; 2.2; 2.2; 2.2; 2.2; 2.2; 2.2; 2.2; 2.2; 2.2; 4.4; 2.2; 2.2; 2.2 INJECTION, SUSPENSION INTRAMUSCULAR at 03:12

## 2023-12-01 NOTE — PROGRESS NOTES
[facility] FM Clinic Progress Note    ID:  Rad Reyna   MRN:  [id number]     12/1/2023    Chief Complaint:  ED follow-up    History of Present Illness:  Rad Reyna is a 68 y.o. male who presents to [facility] FM clinic for follow-up ED visit on 11/07/2023 presumed syncopal episode, acute kidney injury which improved with IV fluids while in the emergency department.  Has been hydrating well, no similar episodes since.     Chronic issues addressed today:    HLD  - ASCVD risk is 8.1 percent  - Med compliant with Vescepa, requests refill  - Extensive discussion held per Cardiology regarding statin initiation, FLP has been ordered to be performed prior to follow-up visit with Cardiology      Chronic conditions not addressed today    Sick Sinus Syndrome  Mild Aortic/Mitral regurgitation  Biventricular pacemaker  - Continue to follow with cards  - No chest pain, palpitations, dyspnea on exertion  - 6 month interval pacer interrogation, 95% atrial pacing    HM:  PSA wnl  Shingrix/Prevnar due  [facility]. last [facility]oscopy greater than 5 years ago at [facility].  No report on file.      Health Maintenance   Topic Date Due    Hepatitis C Screening  Never done    Shingles Vaccine (1 of 2) Never done    Colorectal Cancer Screening  02/28/2023    TETANUS VACCINE  05/30/2027    Lipid Panel  10/20/2028       Social History     Tobacco Use    Smoking status: Never    Smokeless tobacco: Never   Substance Use Topics    Alcohol use: Not Currently    Drug use: Not Currently         Current Outpatient Medications:     ezetimibe (ZETIA) 10 mg tablet, Take 1 tablet (10 mg total) by mouth once daily., Disp: 90 tablet, Rfl: 3    icosapent ethyL (VASCEPA) 1 gram Cap, Take 2 capsules (2 g total) by mouth 2 (two) times daily., Disp: 360 capsule, Rfl: 1    meclizine (ANTIVERT) 25 mg tablet, Take 1 tablet (25 mg total) by mouth 3 (three) times daily as needed for Dizziness., Disp: 20 tablet, Rfl: 0    multivitamin-minerals-lutein Tab, Take 1 tablet  "by mouth once daily., Disp: , Rfl:     Review of patient's allergies indicates:   Allergen Reactions    Aspirin Other (See Comments)     Stomach burning    Atorvastatin      Other reaction(s): Stomach cramps    Penicillins      Other reaction(s): rash  Other reaction(s): rash      Nsaids (non-steroidal anti-inflammatory drug) Nausea Only         Review of Systems:  See HPI      Physical Exam:  /63 (BP Location: Right arm, Patient Position: Sitting, BP Method: X-Large (Automatic))   Pulse 66   Temp 98.3 °F (36.8 °C) (Oral)   Resp 20   Ht 5' 10" (1.778 m)   Wt 77.3 kg (170 lb 6.4 oz)   SpO2 99%   BMI 24.45 kg/m²       General-well appearing elderly  male, sitting comfortably in exam chair no acute distress   HEENT- PERRLA, EOM intact, no visible nystagmus. muddy brown sclera.  Nares patent without rhinorrhea. Oropharynx clear.  Unable to visualize right tympanic membrane secondary amount of hard wax against TM.  Right TM pearly gray, landmarks intact no effusion.  CV-regular rate and rhythm, no lower extremity edema   Lungs-breathing nonlabored, lungs CTA   GI-no epigastric tenderness, abdomen soft nondistended      Assessment/Plan:    IVA  - Continue with adequate hydration   - instructed avoidance of nephrotoxic agents including NSAIDs   - repeat BMP to ensure resolution    HLD  - refill on Vascepa  - keep scheduled follow-up with Cardiology    Immunization due  Prevnar 20 today  - Shingrix vaccine deferred    Colorectal cancer screening  Cologuard ordered    AAA screening  AAA screening ordered    David Milan MD  [employer]  Resident HO-3        "

## 2023-12-17 LAB — NONINV COLON CA DNA+OCC BLD SCRN STL QL: NEGATIVE

## 2024-02-12 ENCOUNTER — OFFICE VISIT (OUTPATIENT)
Dept: CARDIOLOGY | Facility: CLINIC | Age: 69
End: 2024-02-12
Payer: COMMERCIAL

## 2024-02-12 VITALS
DIASTOLIC BLOOD PRESSURE: 81 MMHG | BODY MASS INDEX: 24 KG/M2 | HEART RATE: 76 BPM | RESPIRATION RATE: 20 BRPM | SYSTOLIC BLOOD PRESSURE: 115 MMHG | HEIGHT: 70 IN | TEMPERATURE: 98 F | WEIGHT: 167.63 LBS | OXYGEN SATURATION: 99 %

## 2024-02-12 DIAGNOSIS — I34.0 MILD MITRAL REGURGITATION BY PRIOR ECHOCARDIOGRAM: ICD-10-CM

## 2024-02-12 DIAGNOSIS — I38 VALVULAR HEART DISEASE: ICD-10-CM

## 2024-02-12 DIAGNOSIS — I49.5 SICK SINUS SYNDROME: ICD-10-CM

## 2024-02-12 DIAGNOSIS — Z95.0 S/P PLACEMENT OF CARDIAC PACEMAKER: ICD-10-CM

## 2024-02-12 DIAGNOSIS — I35.1 MILD AORTIC REGURGITATION: Primary | ICD-10-CM

## 2024-02-12 PROCEDURE — 99213 OFFICE O/P EST LOW 20 MIN: CPT | Mod: PBBFAC | Performed by: INTERNAL MEDICINE

## 2024-02-12 NOTE — PROGRESS NOTES
Sinus  02/12/2024 10:30 AM    Subjective:     CHIEF COMPLAINT:   Chief Complaint   Patient presents with    f/u denies chest pain or sob since LV no questions                            HPI:    Mr. Rad Reyna is a 68 y.o. malewith SSS s/p Medtronic dual chamber PPM 7/2021, mild AI, mild MR, and HLD, who presents to cardiology clinic for follow up.     Patient continues to feel well with no cardiovascular complaints.  Continues to bike 12 miles twice weekly and denies any exertional symptoms.  He also denies dizziness, lightheadedness, syncope.  Also denies orthopnea or PND.    Family hx:  Parents, aunts, uncles and grandparents all lived to be in their late 80s to 90s.    Lab Results   Component Value Date    CREATININE 1.51 (H) 11/07/2023    CREATININE 2.05 (H) 11/07/2023    CREATININE 1.01 08/04/2023     Past Medical History    Patient Active Problem List   Diagnosis    Allergic rhinitis    Gastroesophageal reflux disease    Sick sinus syndrome    S/P placement of cardiac pacemaker    Hypercholesteremia    History of DVT (deep vein thrombosis)    Valvular heart disease    Mild aortic regurgitation    Mild mitral regurgitation by prior echocardiogram       Surgical History    Past Surgical History:   Procedure Laterality Date    INSERT / REPLACE / REMOVE PACEMAKER         Social History     Socioeconomic History    Marital status: Single   Tobacco Use    Smoking status: Never    Smokeless tobacco: Never   Substance and Sexual Activity    Alcohol use: Not Currently    Drug use: Not Currently     Social Determinants of Health     Food Insecurity: No Food Insecurity (12/1/2023)    Hunger Vital Sign     Worried About Running Out of Food in the Last Year: Never true     Ran Out of Food in the Last Year: Never true       Family History   Problem Relation Age of Onset    Heart attack Mother     Hypertension Father      Review of patient's allergies indicates:   Allergen Reactions    Aspirin Other (See Comments)      "Stomach burning    Atorvastatin      Other reaction(s): Stomach cramps    Penicillins      Other reaction(s): rash  Other reaction(s): rash      Nsaids (non-steroidal anti-inflammatory drug) Nausea Only       Current Medications    Current Outpatient Medications   Medication Instructions    [START ON 2/21/2024] icosapent ethyL (VASCEPA) 2 g, Oral, 2 times daily    meclizine (ANTIVERT) 25 mg, Oral, 3 times daily PRN    multivitamin-minerals-lutein Tab 1 tablet, Oral, Daily         ROS:   Denies headaches, changes in vision, nausea, vomiting, fever, chills, chest pain, palpitations, dyspnea, abdominal pain, changes in urinary or bowel habits.    Objective:     BP Readings from Last 3 Encounters:   02/12/24 115/81   12/01/23 103/63   11/07/23 118/76        Pulse Readings from Last 3 Encounters:   02/12/24 76   12/01/23 66   11/07/23 63        Temp Readings from Last 3 Encounters:   02/12/24 97.7 °F (36.5 °C) (Oral)   12/01/23 98.3 °F (36.8 °C) (Oral)   11/07/23 98.2 °F (36.8 °C) (Oral)       Wt Readings from Last 3 Encounters:   02/12/24 76 kg (167 lb 9.6 oz)   12/01/23 77.3 kg (170 lb 6.4 oz)   11/07/23 74.1 kg (163 lb 5.8 oz)         PE:  Blood pressure 115/81, pulse 76, temperature 97.7 °F (36.5 °C), temperature source Oral, resp. rate 20, height 5' 10" (1.778 m), weight 76 kg (167 lb 9.6 oz), SpO2 99 %.   Physical Exam  Vitals reviewed.   Constitutional:       General: He is not in acute distress.     Appearance: Normal appearance. He is normal weight. He is not ill-appearing.   HENT:      Head: Normocephalic and atraumatic.      Right Ear: External ear normal.      Left Ear: External ear normal.      Nose: Nose normal.      Mouth/Throat:      Mouth: Mucous membranes are moist.   Eyes:      Conjunctiva/sclera: Conjunctivae normal.   Cardiovascular:      Rate and Rhythm: Normal rate and regular rhythm.      Pulses: Normal pulses.      Heart sounds: Murmur heard.   Pulmonary:      Effort: Pulmonary effort is normal. " No respiratory distress.      Breath sounds: Normal breath sounds. No stridor. No wheezing, rhonchi or rales.   Chest:      Chest wall: No tenderness.   Abdominal:      General: Abdomen is flat. Bowel sounds are normal. There is no distension.      Palpations: Abdomen is soft.   Musculoskeletal:      Cervical back: Neck supple.      Right lower leg: No edema.      Left lower leg: No edema.   Skin:     General: Skin is warm and dry.      Capillary Refill: Capillary refill takes less than 2 seconds.   Neurological:      Mental Status: He is alert and oriented to person, place, and time.   Psychiatric:         Mood and Affect: Mood normal.         Behavior: Behavior normal.                                                                                                                                                                                                                                                                                                                                                                                         CARDIAC TESTING:  Echocardiogram 4/2021:   EF 55-60%, mild AI, mild systolic prolapse of mitral valve with mild MR, PASP 38mmHg        Stress Test  No results found for this or any previous visit.     Coronary Angiogram  No results found for this or any previous visit.     Holter Monitor  No cardiac monitor results found for the past 12 months    Last BMP BMP  Lab Results   Component Value Date     11/07/2023    K 3.9 11/07/2023    CO2 23 11/07/2023    BUN 16.8 11/07/2023    CREATININE 1.51 (H) 11/07/2023    CALCIUM 8.6 (L) 11/07/2023    EGFRNORACEVR 51 11/07/2023      Last CBC     Lab Results   Component Value Date    WBC 7.68 11/07/2023    HGB 12.4 (L) 11/07/2023    HCT 39.5 (L) 11/07/2023    MCV 84.8 11/07/2023     11/07/2023           BNP    Lab Results   Component Value Date    BNP 23.7 04/28/2021    BNP 43.2 10/07/2017     Last lipids    Lab Results   Component  Value Date    CHOL 180 10/20/2023    CHOL 220 (H) 02/22/2023    CHOL 191 05/21/2021    HDL 56 10/20/2023    HDL 64 (H) 02/22/2023    HDL 54 05/21/2021    .00 10/20/2023    .00 (H) 02/22/2023    .00 05/21/2021    TRIG 44 10/20/2023    TRIG 63 02/22/2023    TRIG 70 05/21/2021    TOTALCHOLEST 3 10/20/2023    TOTALCHOLEST 3 02/22/2023    TOTALCHOLEST 4 05/21/2021      LFT     Lab Results   Component Value Date    ALT 20 11/07/2023    ALT 22 08/04/2023    ALT 17 06/08/2023    AST 27 11/07/2023    AST 22 08/04/2023    AST 22 06/08/2023         Plan:   67 y.o. male with a PMH significant for sick sinus syndrome s/p Medtronic dual chamber PPM 7/2021, mild AI, mild MR, and HLD, who presents to cardiology clinic for follow up.      Sick Sinus Syndrome  -Dual-chamber PPM on 7.21.21 (Medtronic)  -Last device interrogation 9/12/23 showed 95% A pacing, 0.1%V pacing. Episodes of AHR and VHR, longest 8 seconds  -Continue device interrogation q 6 months    Valvular Heart Disease  - TTE in 4/2021 showed EF 55 to 60%, mild mitral regurgitation due to mild prolapse, mild aortic regurgitation, trace tricuspid regurgitation with RVSP 38 and trace pulmonic regurgitation. (TTE 4.14.21)  - Moderate MR -stable (previously noted on TTE 9/2017), Trace TR, mild AR, (TTE 3.11.20)  - pt will need serial echocardiograms q3-5 years  Will repeat before next visit  - Denies SOB or RAO     Hypercholesterolemia  Last lipid profile shows , 2 , HDL 56 and TG 44.  Patient has been on Vascepa for long time for unclear reason.  His triglycerides even before starting Vascepa was 65.  Told him he could stop it.    Patient with no cardiac risk factors.  ASCVD score 8% mostly driven by age and race.  Ideal score is 7.5%.  As such I do not believe a statin will alter his overall risk significantly.  Moreover patient would not like to start a statin and he reports no family history of heart disease.    Judit Brand MD

## 2024-03-01 ENCOUNTER — OFFICE VISIT (OUTPATIENT)
Dept: FAMILY MEDICINE | Facility: CLINIC | Age: 69
End: 2024-03-01
Payer: COMMERCIAL

## 2024-03-01 VITALS
BODY MASS INDEX: 24.11 KG/M2 | HEART RATE: 70 BPM | DIASTOLIC BLOOD PRESSURE: 84 MMHG | HEIGHT: 70 IN | SYSTOLIC BLOOD PRESSURE: 122 MMHG | WEIGHT: 168.38 LBS | TEMPERATURE: 98 F | RESPIRATION RATE: 18 BRPM | OXYGEN SATURATION: 100 %

## 2024-03-01 DIAGNOSIS — Z00.00 WELLNESS EXAMINATION: Primary | ICD-10-CM

## 2024-03-01 PROCEDURE — 99213 OFFICE O/P EST LOW 20 MIN: CPT | Mod: PBBFAC | Performed by: STUDENT IN AN ORGANIZED HEALTH CARE EDUCATION/TRAINING PROGRAM

## 2024-03-01 NOTE — PROGRESS NOTES
"[facility] Progress Note    ID:  Rad Reyna   MRN:  [id number]     3/1/2024    Chief Complaint: routine follow up    History of Present Illness:  Rad Reyna is a 68 y.o. male who presents to [facility] FM clinic for routine visit.     Chronic issues addressed today:    HLD  - ASCVD risk is 8.1 percent  - Vescepa was D/C'd by cardiology  - Not interested in statin due to side effects      Chronic conditions not addressed today    Sick Sinus Syndrome  Mild Aortic/Mitral regurgitation  Medtronic Biventricular pacemaker  - Continue to follow with cardiology, last appt 2/12/24  - Denies chest pain, palpitations, weakness, dizziness, presyncope    HM:  PSA wnl  Shingrix due  Cologuard negative 12/11/2023.  Hep C screening ordered      Current Outpatient Medications:     meclizine (ANTIVERT) 25 mg tablet, Take 1 tablet (25 mg total) by mouth 3 (three) times daily as needed for Dizziness., Disp: 20 tablet, Rfl: 0    multivitamin-minerals-lutein Tab, Take 1 tablet by mouth once daily., Disp: , Rfl:     Review of patient's allergies indicates:   Allergen Reactions    Aspirin Other (See Comments)     Stomach burning    Atorvastatin      Other reaction(s): Stomach cramps    Penicillins      Other reaction(s): rash  Other reaction(s): rash      Nsaids (non-steroidal anti-inflammatory drug) Nausea Only         Review of Systems:  See HPI      Physical Exam:  /84 (BP Location: Left arm, Patient Position: Sitting, BP Method: Large (Automatic))   Pulse 70   Temp 98 °F (36.7 °C) (Oral)   Resp 18   Ht 5' 10" (1.778 m)   Wt 76.4 kg (168 lb 6.4 oz)   SpO2 100%   BMI 24.16 kg/m²       General-well appearing elderly  male, sitting comfortably in exam chair no acute distress   HEENT- non-icteric sclera  CV-regular rate and rhythm, murmur present, no lower extremity edema   Lungs-breathing non labored      Assessment/Plan:    HLD  - Discussed statin, patient with informed denial  - Per cardiology note, no " CV benefit from statin    Immunization due  - Shingrix, informed denial today   - Hep C screening pending    David Milan MD  [employer] FM Resident HO-3

## 2024-03-04 ENCOUNTER — LAB VISIT (OUTPATIENT)
Dept: LAB | Facility: HOSPITAL | Age: 69
End: 2024-03-04
Attending: STUDENT IN AN ORGANIZED HEALTH CARE EDUCATION/TRAINING PROGRAM
Payer: COMMERCIAL

## 2024-03-04 DIAGNOSIS — N17.9 AKI (ACUTE KIDNEY INJURY): ICD-10-CM

## 2024-03-04 DIAGNOSIS — Z12.11 COLON CANCER SCREENING: ICD-10-CM

## 2024-03-04 LAB
ANION GAP SERPL CALC-SCNC: 5 MEQ/L
BUN SERPL-MCNC: 13.1 MG/DL (ref 8.4–25.7)
CALCIUM SERPL-MCNC: 8.6 MG/DL (ref 8.8–10)
CHLORIDE SERPL-SCNC: 108 MMOL/L (ref 98–107)
CO2 SERPL-SCNC: 28 MMOL/L (ref 23–31)
CREAT SERPL-MCNC: 1.22 MG/DL (ref 0.73–1.18)
CREAT/UREA NIT SERPL: 11
GFR SERPLBLD CREATININE-BSD FMLA CKD-EPI: >60 MLS/MIN/1.73/M2
GLUCOSE SERPL-MCNC: 91 MG/DL (ref 82–115)
HCV AB SERPL QL IA: NONREACTIVE
POTASSIUM SERPL-SCNC: 4.2 MMOL/L (ref 3.5–5.1)
SODIUM SERPL-SCNC: 141 MMOL/L (ref 136–145)

## 2024-03-04 PROCEDURE — 86803 HEPATITIS C AB TEST: CPT

## 2024-03-04 PROCEDURE — 80048 BASIC METABOLIC PNL TOTAL CA: CPT

## 2024-03-04 PROCEDURE — 36415 COLL VENOUS BLD VENIPUNCTURE: CPT

## 2024-03-12 ENCOUNTER — CLINICAL SUPPORT (OUTPATIENT)
Dept: CARDIOLOGY | Facility: CLINIC | Age: 69
End: 2024-03-12
Payer: COMMERCIAL

## 2024-03-12 DIAGNOSIS — Z95.0 S/P PLACEMENT OF CARDIAC PACEMAKER: Primary | ICD-10-CM

## 2024-03-12 PROCEDURE — 93280 PM DEVICE PROGR EVAL DUAL: CPT | Mod: PBBFAC

## 2024-03-14 ENCOUNTER — HOSPITAL ENCOUNTER (OUTPATIENT)
Dept: CARDIOLOGY | Facility: HOSPITAL | Age: 69
Discharge: HOME | End: 2024-03-14
Attending: INTERNAL MEDICINE
Payer: COMMERCIAL

## 2024-03-14 VITALS
DIASTOLIC BLOOD PRESSURE: 82 MMHG | HEIGHT: 70 IN | WEIGHT: 168 LBS | BODY MASS INDEX: 24.05 KG/M2 | SYSTOLIC BLOOD PRESSURE: 117 MMHG

## 2024-03-14 DIAGNOSIS — I35.1 MILD AORTIC REGURGITATION: ICD-10-CM

## 2024-03-14 DIAGNOSIS — I34.0 MILD MITRAL REGURGITATION BY PRIOR ECHOCARDIOGRAM: ICD-10-CM

## 2024-03-14 PROCEDURE — 93306 TTE W/DOPPLER COMPLETE: CPT

## 2024-03-15 LAB
AV INDEX (PROSTH): 0.72
AV MEAN GRADIENT: 6 MMHG
AV PEAK GRADIENT: 12 MMHG
AV REGURGITATION PRESSURE HALF TIME: 534.22 MS
AV VALVE AREA BY VELOCITY RATIO: 2.67 CM²
AV VALVE AREA: 2.28 CM²
AV VELOCITY RATIO: 0.84
BSA FOR ECHO PROCEDURE: 1.94 M2
CV ECHO LV RWT: 0.3 CM
DOP CALC AO PEAK VEL: 1.72 M/S
DOP CALC AO VTI: 36.3 CM
DOP CALC LVOT AREA: 3.2 CM2
DOP CALC LVOT DIAMETER: 2.01 CM
DOP CALC LVOT PEAK VEL: 1.45 M/S
DOP CALC LVOT STROKE VOLUME: 82.78 CM3
DOP CALC MV VTI: 40.7 CM
DOP CALCLVOT PEAK VEL VTI: 26.1 CM
E WAVE DECELERATION TIME: 348.26 MSEC
E/A RATIO: 1.57
E/E' RATIO: 9.71 M/S
ECHO LV POSTERIOR WALL: 0.95 CM (ref 0.6–1.1)
FRACTIONAL SHORTENING: 34 % (ref 28–44)
HR MV ECHO: 61 BPM
INTERVENTRICULAR SEPTUM: 0.9 CM (ref 0.6–1.1)
LEFT ATRIUM SIZE: 5.13 CM
LEFT ATRIUM VOLUME INDEX MOD: 43.8 ML/M2
LEFT ATRIUM VOLUME MOD: 85 CM3
LEFT INTERNAL DIMENSION IN SYSTOLE: 4.26 CM (ref 2.1–4)
LEFT VENTRICLE DIASTOLIC VOLUME INDEX: 97.94 ML/M2
LEFT VENTRICLE DIASTOLIC VOLUME: 190 ML
LEFT VENTRICLE MASS INDEX: 129 G/M2
LEFT VENTRICLE SYSTOLIC VOLUME INDEX: 41.2 ML/M2
LEFT VENTRICLE SYSTOLIC VOLUME: 80 ML
LEFT VENTRICULAR INTERNAL DIMENSION IN DIASTOLE: 6.41 CM (ref 3.5–6)
LEFT VENTRICULAR MASS: 250.35 G
LV LATERAL E/E' RATIO: 7.29 M/S
LV SEPTAL E/E' RATIO: 14.57 M/S
LVOT MG: 3.22 MMHG
LVOT MV: 0.78 CM/S
MV MEAN GRADIENT: 2 MMHG
MV PEAK A VEL: 0.65 M/S
MV PEAK E VEL: 1.02 M/S
MV PEAK GRADIENT: 6 MMHG
MV STENOSIS PRESSURE HALF TIME: 100.99 MS
MV VALVE AREA BY CONTINUITY EQUATION: 2.03 CM2
MV VALVE AREA P 1/2 METHOD: 2.18 CM2
OHS LV EJECTION FRACTION SIMPSONS BIPLANE MOD: 58 %
PISA AR MAX VEL: 4.26 M/S
PISA TR MAX VEL: 2.43 M/S
RA PRESSURE ESTIMATED: 3 MMHG
RIGHT VENTRICULAR END-DIASTOLIC DIMENSION: 2.79 CM
RV TB RVSP: 5 MMHG
SINUS: 3.1 CM
TDI LATERAL: 0.14 M/S
TDI SEPTAL: 0.07 M/S
TDI: 0.11 M/S
TR MAX PG: 24 MMHG
TV REST PULMONARY ARTERY PRESSURE: 27 MMHG
Z-SCORE OF LEFT VENTRICULAR DIMENSION IN END DIASTOLE: 1.51
Z-SCORE OF LEFT VENTRICULAR DIMENSION IN END SYSTOLE: 1.8

## 2024-04-03 NOTE — PROGRESS NOTES
Faculty Attestation: Patient was seen in [facility] clinic. Patient was seen and examined by the resident.  I have personally reviewed History of the Present Illness , Review of Systems, PFSH , Physical Exam, Assessment and Plan documented by the resident. I was immediately available throughout the encounter. HC antibody testing is Negative . PSA testing is > 1 year old. Will need repeat. CKD with most recent creatinine 1.51 on  11/7/2024. Repeat creatinine today 1.22 on 3/4/2024. Importance of adherence to treatment recommendations discussed with patient at the time of the visit by resident. Services were furnished in a primary care center in the outpatient department at a [facility] hospital. I discussed the patient with the resident and agree with resident's findings and plan as documented in the resident note. Chayito Dia MD

## 2024-04-14 ENCOUNTER — OFFICE VISIT (OUTPATIENT)
Dept: URGENT CARE | Facility: CLINIC | Age: 69
End: 2024-04-14
Payer: COMMERCIAL

## 2024-04-14 VITALS
DIASTOLIC BLOOD PRESSURE: 81 MMHG | RESPIRATION RATE: 18 BRPM | HEART RATE: 65 BPM | HEIGHT: 70 IN | SYSTOLIC BLOOD PRESSURE: 121 MMHG | TEMPERATURE: 99 F | WEIGHT: 166 LBS | OXYGEN SATURATION: 100 % | BODY MASS INDEX: 23.77 KG/M2

## 2024-04-14 DIAGNOSIS — S61.219A FINGER LACERATION, INITIAL ENCOUNTER: Primary | ICD-10-CM

## 2024-04-14 DIAGNOSIS — M79.645 PAIN OF LEFT MIDDLE FINGER: ICD-10-CM

## 2024-04-14 PROCEDURE — 99499 UNLISTED E&M SERVICE: CPT | Mod: S$PBB,,, | Performed by: NURSE PRACTITIONER

## 2024-04-14 PROCEDURE — 25000003 PHARM REV CODE 250

## 2024-04-14 PROCEDURE — 99214 OFFICE O/P EST MOD 30 MIN: CPT | Mod: PBBFAC | Performed by: NURSE PRACTITIONER

## 2024-04-14 RX ORDER — ACETAMINOPHEN 500 MG
1000 TABLET ORAL
Status: COMPLETED | OUTPATIENT
Start: 2024-04-14 | End: 2024-04-14

## 2024-04-14 RX ORDER — LIDOCAINE HYDROCHLORIDE AND EPINEPHRINE 10; 10 MG/ML; UG/ML
10 INJECTION, SOLUTION INFILTRATION; PERINEURAL
Status: DISCONTINUED | OUTPATIENT
Start: 2024-04-14 | End: 2024-04-29

## 2024-04-14 RX ORDER — LIDOCAINE HYDROCHLORIDE 10 MG/ML
1 INJECTION INFILTRATION; PERINEURAL
Status: COMPLETED | OUTPATIENT
Start: 2024-04-14 | End: 2024-04-14

## 2024-04-14 RX ADMIN — LIDOCAINE HYDROCHLORIDE 1 ML: 10 INJECTION, SOLUTION INFILTRATION; PERINEURAL at 05:04

## 2024-04-14 RX ADMIN — ACETAMINOPHEN 1000 MG: 500 TABLET ORAL at 05:04

## 2024-04-14 NOTE — PATIENT INSTRUCTIONS
Please follow instructions on patient education material.      Return to urgent care in 2 to 3 days if symptoms are not improving, immediately if you develop any new or worsening symptoms.   You have cellulitis. This is an infection of your skin and the fat tissue under the skin. This can happen with any small knick, scratch or cut in the skin, allowing bacteria to get in.    - do not put hydrogen peroxide, rubbing alcohol, or any items from the kitchen into the wound  - follow up with your PCP next week for a wound check  - keep wound clean and dry    Wound Care: Twice daily wound care as discussed.   Loosen the bandage if needed.   Follow up in 1-2 days for a recheck.   Suture removal/recheck in 10 days.   Monitor for any signs of infection including worsening redness, swelling, purulent discharge, fever, body aches, or chills and seek follow up care as needed.   - if you get fever, increasing pain, increasing drainage, uncontrolled bleeding go to the ER.

## 2024-04-14 NOTE — PROCEDURES
Laceration Repair to tip of middle finger    Date/Time: 4/14/2024 4:45 PM    Performed by: Alberto Dexter FNP  Authorized by: Alberto Dexter FNP  Body area: upper extremity  Laceration length: 3 cm  Foreign bodies: no foreign bodies  Tendon involvement: none  Nerve involvement: none  Vascular damage: no  Anesthesia: local infiltration    Anesthesia:  Local Anesthetic: lidocaine 1% without epinephrine  Anesthetic total: 1.5 mL    Patient sedated: no  Preparation: Patient was prepped and draped in the usual sterile fashion.  Irrigation solution: saline  Irrigation method: syringe  Debridement: minimal  Degree of undermining: minimal  Number of sutures: 8  Technique: simple (jagged laceration tip of finger)  Approximation: loose  Approximation difficulty: complex  Dressing: 4x4 sterile gauze and non-stick sterile dressing  Patient tolerance: Patient tolerated the procedure well with no immediate complications

## 2024-04-14 NOTE — PROGRESS NOTES
"Subjective:      Patient ID: Rad Reyna is a 68 y.o. male.    Vitals:  height is 5' 10" (1.778 m) and weight is 75.3 kg (166 lb). His oral temperature is 98.5 °F (36.9 °C). His blood pressure is 121/81 and his pulse is 65. His respiration is 18 and oxygen saturation is 100%.     Chief Complaint: Laceration (Laceration to L middle finger x 1 hour ago Patient was doing woodwork and cabinet fell onto finger)    HPI As stated in CC. PT stated wood cabinet fell and skimmed his finger and took off the skin and caused injury just Prior to arrival. Pt states he cleaned it covered wound . Denies numbness or tinging to left hand or finger. Tetanus UTD.    Skin:  Positive for erythema.      Objective:     Physical Exam   Constitutional: He is oriented to person, place, and time. He appears well-developed.  Non-toxic appearance. He does not appear ill. No distress.   HENT:   Head: Atraumatic.   Nose: No purulent discharge. Right sinus exhibits no maxillary sinus tenderness and no frontal sinus tenderness. Left sinus exhibits no maxillary sinus tenderness and no frontal sinus tenderness.   Mouth/Throat: Uvula is midline.   Eyes: Right eye exhibits no discharge. Left eye exhibits no discharge. Extraocular movement intact   Neck: Neck supple. No neck rigidity present.   Cardiovascular: Regular rhythm and normal pulses.   Pulmonary/Chest: Effort normal and breath sounds normal. No stridor. No respiratory distress. He has no wheezes. He has no rhonchi. He has no rales. He exhibits no tenderness.   Musculoskeletal: Normal range of motion.         General: Swelling, tenderness and signs of injury present. No deformity. Normal range of motion.      Right hand: Motor /Testing: Middle Finger: 5/5.      Left hand: He exhibits normal capillary refill. Decreased sensation is not present in the ulnar distribution and is not present in the medial redistribution. Left middle finger: Exhibits bleeding and tenderness (Painful to touch). " Motor /Testing: Middle Finger: 5/5.        Hands:       Right lower leg: No edema.      Left lower leg: No edema.   Lymphadenopathy:     He has no cervical adenopathy.   Neurological: He is alert and oriented to person, place, and time.   Skin: Skin is warm, dry and not diaphoretic. Capillary refill takes less than 2 seconds. erythema         Comments: Tip of middle finger pad is lacerated. Copious bleeding noted controlled by compression gauze held in place by tape. Pt has severe pain, very apprehensive to exam. Laceration Is jagged, uneven and about 2 cm long to pad of finger   Psychiatric: His behavior is normal.   Nursing note and vitals reviewed.      Assessment:     1. Finger laceration, initial encounter    2. Pain of left middle finger        Plan:   Er precautions given  - do not put hydrogen peroxide, rubbing alcohol, or any items from the kitchen into the wound  - follow up with your PCP next week for a wound check  - keep wound clean and dry    Wound Care: Twice daily wound care as discussed.   Loosen the bandage if needed.   Follow up in 1-2 days for a recheck.   Suture removal/recheck in 10 days.   Monitor for any signs of infection including worsening redness, swelling, purulent discharge, fever, body aches, or chills and seek follow up care as needed.   - if you get fever, inc  Finger laceration, initial encounter  -     LIDOcaine-EPINEPHrine 1%-1:100,000 injection 10 mL  -     Laceration Repair to tip of middle finger    Pain of left middle finger  -     Laceration Repair to tip of middle finger    Other orders  -     acetaminophen tablet 1,000 mg  -     LIDOcaine HCL 10 mg/ml (1%) injection 1 mL

## 2024-04-17 ENCOUNTER — HOSPITAL ENCOUNTER (OUTPATIENT)
Dept: RADIOLOGY | Facility: HOSPITAL | Age: 69
Discharge: HOME | End: 2024-04-17
Attending: NURSE PRACTITIONER
Payer: COMMERCIAL

## 2024-04-17 ENCOUNTER — OFFICE VISIT (OUTPATIENT)
Dept: URGENT CARE | Facility: CLINIC | Age: 69
End: 2024-04-17
Payer: COMMERCIAL

## 2024-04-17 VITALS
TEMPERATURE: 98 F | HEIGHT: 70 IN | RESPIRATION RATE: 18 BRPM | HEART RATE: 61 BPM | OXYGEN SATURATION: 100 % | SYSTOLIC BLOOD PRESSURE: 119 MMHG | DIASTOLIC BLOOD PRESSURE: 81 MMHG | BODY MASS INDEX: 23.62 KG/M2 | WEIGHT: 165 LBS

## 2024-04-17 DIAGNOSIS — M79.645 PAIN OF LEFT MIDDLE FINGER: Primary | ICD-10-CM

## 2024-04-17 DIAGNOSIS — S61.219S: ICD-10-CM

## 2024-04-17 PROCEDURE — 73130 X-RAY EXAM OF HAND: CPT | Mod: TC,LT

## 2024-04-17 PROCEDURE — 99213 OFFICE O/P EST LOW 20 MIN: CPT | Mod: S$PBB,,, | Performed by: NURSE PRACTITIONER

## 2024-04-17 PROCEDURE — 99215 OFFICE O/P EST HI 40 MIN: CPT | Mod: PBBFAC,25 | Performed by: NURSE PRACTITIONER

## 2024-04-17 RX ORDER — HYDROCODONE BITARTRATE AND ACETAMINOPHEN 7.5; 325 MG/1; MG/1
1 TABLET ORAL EVERY 6 HOURS PRN
Qty: 15 TABLET | Refills: 0 | Status: SHIPPED | OUTPATIENT
Start: 2024-04-17 | End: 2024-06-07

## 2024-04-17 RX ORDER — SULFAMETHOXAZOLE AND TRIMETHOPRIM 800; 160 MG/1; MG/1
1 TABLET ORAL 2 TIMES DAILY
Qty: 14 TABLET | Refills: 0 | Status: SHIPPED | OUTPATIENT
Start: 2024-04-17 | End: 2024-04-24

## 2024-04-17 NOTE — PATIENT INSTRUCTIONS
Please follow instructions on patient education material.  Return to urgent care in 2 to 3 days if symptoms are not improving, immediately if you develop any new or worsening symptoms.   X-ray of hand performed to rule out fracture.  X-ray reviewed.  X-ray results pending we will notify you of any abnormality  Finger splint for comfort keep splint in place for comfort only unless you are notified that you have a finger abnormality such as a fracture.  Follow with PCP in 2-3 days  Sutures should be removed in the next 6 days.  If you see color change in your fingers (blue, black, purple, white) go to ER.  If sudden severe pain or splint feels too tight, go to ER.  Start antibiotics today    Monitor for any signs of infection including worsening redness, swelling, purulent discharge, fever, body aches, or chills and seek follow up care as needed.

## 2024-04-17 NOTE — PROGRESS NOTES
"Subjective:      Patient ID: Rad Reyna is a 68 y.o. male.    Vitals:  height is 5' 10" (1.778 m) and weight is 74.8 kg (165 lb). His oral temperature is 98.2 °F (36.8 °C). His blood pressure is 119/81 and his pulse is 61. His respiration is 18 and oxygen saturation is 100%.     Chief Complaint: Finger Pain (Patient reports L hand middle finger pain x 4 days Return visit )    HPI As stated in CC. Pt  presents with c/o of Left hand middle finger pain and reports that tylenol is not helping. Pain is described as throbbing.  Pt last dose of tylenol at 10 am today. Pt reports he feels like dressing that he kept in place was maybe too tight. Pt denied fever  ROS   Objective:     Physical Exam   Constitutional: He is oriented to person, place, and time. He appears well-developed.  Non-toxic appearance. He does not appear ill. No distress.   HENT:   Head: Normocephalic and atraumatic.   Nose: Nose normal. No purulent discharge. Right sinus exhibits no maxillary sinus tenderness and no frontal sinus tenderness. Left sinus exhibits no maxillary sinus tenderness and no frontal sinus tenderness.   Mouth/Throat: Uvula is midline. Mucous membranes are moist.   Eyes: Conjunctivae are normal. Right eye exhibits no discharge. Left eye exhibits no discharge. Extraocular movement intact   Neck: Neck supple. No neck rigidity present.   Cardiovascular: Normal rate, regular rhythm and normal pulses.   Pulmonary/Chest: Effort normal and breath sounds normal. No respiratory distress. He has no wheezes. He has no rales.   Abdominal: Normal appearance and bowel sounds are normal.   Musculoskeletal: Normal range of motion.         General: Tenderness and signs of injury present. No swelling or deformity. Normal range of motion.      Right lower leg: No edema.      Left lower leg: No edema.      Comments: Pain at or around laceration to tip (DIP joint) of left hand middle finger. Pt has stiches still intact, no redness, discharge or " increased swelling or discoloration to finger. Brisk cap refill, pt has full range of motion to finger and hand.    Lymphadenopathy:     He has no cervical adenopathy.   Neurological: He is alert and oriented to person, place, and time.   Skin: Skin is warm, dry and not diaphoretic. Capillary refill takes less than 2 seconds.   Psychiatric: Thought content normal.   Nursing note and vitals reviewed.chaperone present (girlfriend)         Assessment:     1. Pain of left middle finger    2. Laceration of finger of left hand without foreign body without damage to nail, sequela    XR HAND COMPLETE 3 VIEW LEFT    Result Date: 4/17/2024  EXAMINATION: XR HAND COMPLETE 3 VIEW LEFT CLINICAL HISTORY: Pain in left finger(s) COMPARISON: None. FINDINGS: There is soft tissue swelling in the middle finger.  There is no acute fracture or malalignment.  There is no radiopaque foreign body.     No acute bony abnormality. Electronically signed by: Koki Hancock Date:    04/17/2024 Time:    15:02       Plan:   X-ray of hand performed to rule out fracture.  X-ray reviewed.  X-ray results pending we will notify you of any abnormality  Finger splint for comfort keep splint in place for comfort only unless you are notified that you have a finger abnormality such as a fracture.  Follow with PCP in 2-3 days  Sutures should be removed in the next 6 days.  If you see color change in your fingers (blue, black, purple, white) go to ER.  If sudden severe pain or splint feels too tight, go to ER.  Start antibiotics today    Pain of left middle finger  -     XR HAND COMPLETE 3 VIEW LEFT  -     sulfamethoxazole-trimethoprim 800-160mg (BACTRIM DS) 800-160 mg Tab; Take 1 tablet by mouth 2 (two) times daily. for 7 days  Dispense: 14 tablet; Refill: 0  -     HYDROcodone-acetaminophen (NORCO) 7.5-325 mg per tablet; Take 1 tablet by mouth every 6 (six) hours as needed for Pain.  Dispense: 15 tablet; Refill: 0    Laceration of finger of left hand without  foreign body without damage to nail, sequela  -     HYDROcodone-acetaminophen (NORCO) 7.5-325 mg per tablet; Take 1 tablet by mouth every 6 (six) hours as needed for Pain.  Dispense: 15 tablet; Refill: 0

## 2024-04-29 ENCOUNTER — OFFICE VISIT (OUTPATIENT)
Dept: URGENT CARE | Facility: CLINIC | Age: 69
End: 2024-04-29
Payer: COMMERCIAL

## 2024-04-29 VITALS
TEMPERATURE: 98 F | BODY MASS INDEX: 23.85 KG/M2 | SYSTOLIC BLOOD PRESSURE: 119 MMHG | HEART RATE: 79 BPM | DIASTOLIC BLOOD PRESSURE: 78 MMHG | WEIGHT: 166.63 LBS | OXYGEN SATURATION: 99 % | HEIGHT: 70 IN | RESPIRATION RATE: 16 BRPM

## 2024-04-29 DIAGNOSIS — Z48.02 VISIT FOR SUTURE REMOVAL: Primary | ICD-10-CM

## 2024-04-29 PROCEDURE — 99214 OFFICE O/P EST MOD 30 MIN: CPT | Mod: PBBFAC | Performed by: FAMILY MEDICINE

## 2024-04-29 PROCEDURE — 99024 POSTOP FOLLOW-UP VISIT: CPT | Mod: ,,, | Performed by: FAMILY MEDICINE

## 2024-04-29 PROCEDURE — 25000003 PHARM REV CODE 250

## 2024-04-29 RX ORDER — LIDOCAINE HYDROCHLORIDE 20 MG/ML
INJECTION, SOLUTION EPIDURAL; INFILTRATION; INTRACAUDAL; PERINEURAL
Status: DISCONTINUED
Start: 2024-04-29 | End: 2024-04-29 | Stop reason: HOSPADM

## 2024-04-29 RX ORDER — LIDOCAINE HYDROCHLORIDE 20 MG/ML
5 INJECTION, SOLUTION INFILTRATION; PERINEURAL
Qty: 5 ML | Refills: 0 | Status: COMPLETED | OUTPATIENT
Start: 2024-04-29 | End: 2024-04-29

## 2024-04-29 RX ADMIN — LIDOCAINE HYDROCHLORIDE 5 ML: 20 INJECTION, SOLUTION INFILTRATION; PERINEURAL at 05:04

## 2024-04-29 NOTE — PROGRESS NOTES
"Subjective:       Patient ID: Rad Reyna is a 68 y.o. male.    Vitals:  height is 5' 10" (1.778 m) and weight is 75.6 kg (166 lb 9.6 oz). His temperature is 98.4 °F (36.9 °C). His blood pressure is 119/78 and his pulse is 79. His respiration is 16 and oxygen saturation is 99%.     Chief Complaint: Suture / Staple Removal (Return visit for suture removal. 8 sutures placed to Lt middle finger on 4/14. Tdap UTD.)  Patient here for suture removal after laceration repair on 04/14.  Has let the wound dry, mild discomfort occasionally.  No drainage.  No fever.      All other systems are negative    Chart reviewed    Objective:   Physical Exam   Constitutional: He appears well-developed.  Non-toxic appearance. He does not appear ill. No distress.   Abdominal: Normal appearance.   Musculoskeletal:      Comments: Left middle finger-the finger has full range of motion.  The wound bed is very dry.  Patient had discomfort upon the removal of several sutures, therefore digital block was performed.  The nurse remove the remaining sutures.   Neurological: no focal deficit. He is alert. He displays no weakness.   Skin: Skin is not diaphoretic. Capillary refill takes less than 2 seconds.   Psychiatric: His behavior is normal. Mood normal.   Nursing note and vitals reviewed.        Assessment:     1. Visit for suture removal            Plan:   Sutures removed today, but this wound bed is very dry and difficult to ensure that all sutures were adequately removed.  I instructed him on soaking, applying petrolatum and keeping covered, change twice a day.  If this wound causes any symptoms it will need to be reexamined to ensure there is no suture retention.  I instructed him on this and he voiced understanding.      Visit for suture removal  -     LIDOcaine HCL 20 mg/ml (2%) injection 5 mL  Dispense: 5 mL; Refill: 0    Other orders  -     LIDOcaine (PF) 20 mg/mL (2%) 20 mg/mL (2 %) injection        Please note: This chart was " completed via voice to text dictation. It may contain typographical/word recognition errors. If there are any questions, please contact the provider for final clarification.

## 2024-06-07 ENCOUNTER — OFFICE VISIT (OUTPATIENT)
Dept: FAMILY MEDICINE | Facility: CLINIC | Age: 69
End: 2024-06-07
Payer: COMMERCIAL

## 2024-06-07 VITALS
RESPIRATION RATE: 20 BRPM | TEMPERATURE: 98 F | HEIGHT: 70 IN | DIASTOLIC BLOOD PRESSURE: 83 MMHG | WEIGHT: 166 LBS | OXYGEN SATURATION: 100 % | HEART RATE: 75 BPM | BODY MASS INDEX: 23.77 KG/M2 | SYSTOLIC BLOOD PRESSURE: 126 MMHG

## 2024-06-07 DIAGNOSIS — N52.9 ERECTILE DYSFUNCTION, UNSPECIFIED ERECTILE DYSFUNCTION TYPE: ICD-10-CM

## 2024-06-07 DIAGNOSIS — S03.00XA DISLOCATION OF TEMPOROMANDIBULAR JOINT, INITIAL ENCOUNTER: Primary | ICD-10-CM

## 2024-06-07 PROCEDURE — 99213 OFFICE O/P EST LOW 20 MIN: CPT | Mod: PBBFAC | Performed by: STUDENT IN AN ORGANIZED HEALTH CARE EDUCATION/TRAINING PROGRAM

## 2024-06-07 RX ORDER — SILDENAFIL 100 MG/1
50 TABLET, FILM COATED ORAL DAILY PRN
Qty: 10 TABLET | Refills: 5 | Status: SHIPPED | OUTPATIENT
Start: 2024-06-07 | End: 2024-12-04

## 2024-06-07 NOTE — PROGRESS NOTES
[facility] Progress Note    ID:  Rad Reyna   MRN:  [id number]     6/7/2024    Chief Complaint:  Routine follow up    History of Present Illness:  Rad Reyna is a 68 y.o. male who presents to [facility] for routine visit.     New Complaints:  Erectile Dysfunction- difficulty initiating/maintaining erection.  Previously was using sildenafil 50 mg, splinting 100 mg tablets without issues.  Not on nitrates.  No previous chest pain, and blood pressure is well controlled.     Jaw clicking- roughly 7 week history left-sided jaw clicking with chewing, absence of pain, locking, or trauma.  Patient attributes to cracking neck 3 days prior to onset.  No previous episodes.  Appointment with the dentist 06/06/2024, who said it will go away on its own.    Chronic conditions not addressed:  HLD  - ASCVD risk is 8.1 percent  - Rahcepa was D/C'd by cardiology  - Not interested in statin, and no protective indication    Sick Sinus Syndrome  Mild Aortic/Mitral regurgitation  Medtronic Biventricular pacemaker  - Continue to follow with cardiology, last appt 2/12/24.  At that time pacer was interrogated with 2 sec episode of SVT  - Denies chest pain, palpitations, weakness, dizziness, presyncope    HM:  PSA due  Shingrix due- declined  Cologuard negative 12/11/2023  Hep C screening 3/2024 neg      Current Outpatient Medications:     meclizine (ANTIVERT) 25 mg tablet, Take 1 tablet (25 mg total) by mouth 3 (three) times daily as needed for Dizziness. (Patient not taking: Reported on 4/29/2024), Disp: 20 tablet, Rfl: 0    multivitamin-minerals-lutein Tab, Take 1 tablet by mouth once daily. (Patient not taking: Reported on 4/29/2024), Disp: , Rfl:     Review of patient's allergies indicates:   Allergen Reactions    Aspirin Other (See Comments)     Stomach burning    Atorvastatin      Other reaction(s): Stomach cramps    Penicillins      Other reaction(s): rash  Other reaction(s): rash      Nsaids (non-steroidal  "anti-inflammatory drug) Nausea Only         Review of Systems:  See HPI      Physical Exam:  /83 (BP Location: Left arm, Patient Position: Sitting, BP Method: Medium (Automatic))   Pulse 75   Temp 98.2 °F (36.8 °C) (Oral)   Resp 20   Ht 5' 10" (1.778 m)   Wt 75.3 kg (166 lb)   SpO2 100%   BMI 23.82 kg/m²       General-well appearing elderly  male, sitting comfortably in exam chair no acute distress   HEENT- non-icteric sclera  CV-regular rate and rhythm, murmur present, no lower extremity edema   Lungs-breathing non labored      Assessment/Plan:    TMJ  - info given regarding exercises in behavioral modifications  - may need to see TMJ specialists at develops pain     Erectile Dysfunction  - Sildenafil 100 mg, 1/2 tab  - instructed to discuss with cardiologist prior to initiation of use      Immunization due  - Shingrix, informed denial today     David Milan MD  [employer]  Resident HO-3      "

## 2024-07-15 ENCOUNTER — OFFICE VISIT (OUTPATIENT)
Dept: CARDIOLOGY | Facility: CLINIC | Age: 69
End: 2024-07-15
Payer: COMMERCIAL

## 2024-07-15 VITALS
HEIGHT: 70 IN | WEIGHT: 163 LBS | BODY MASS INDEX: 23.34 KG/M2 | RESPIRATION RATE: 24 BRPM | OXYGEN SATURATION: 100 % | HEART RATE: 64 BPM | TEMPERATURE: 99 F | DIASTOLIC BLOOD PRESSURE: 87 MMHG | SYSTOLIC BLOOD PRESSURE: 119 MMHG

## 2024-07-15 DIAGNOSIS — Z95.0 S/P PLACEMENT OF CARDIAC PACEMAKER: ICD-10-CM

## 2024-07-15 DIAGNOSIS — I49.5 SICK SINUS SYNDROME: ICD-10-CM

## 2024-07-15 DIAGNOSIS — I34.0 MODERATE MITRAL REGURGITATION: ICD-10-CM

## 2024-07-15 DIAGNOSIS — E78.00 HYPERCHOLESTEREMIA: ICD-10-CM

## 2024-07-15 DIAGNOSIS — Z86.718 HISTORY OF DVT (DEEP VEIN THROMBOSIS): ICD-10-CM

## 2024-07-15 DIAGNOSIS — I35.1 MILD AORTIC REGURGITATION: Primary | ICD-10-CM

## 2024-07-15 PROCEDURE — 99214 OFFICE O/P EST MOD 30 MIN: CPT | Mod: PBBFAC | Performed by: INTERNAL MEDICINE

## 2024-07-15 NOTE — PROGRESS NOTES
Subjective:     CHIEF COMPLAINT:   No chief complaint on file.                          HPI:    Mr. Rad Reyna is a 68 y.o. malewith SSS s/p Medtronic dual chamber PPM 7/2021, mild AI, mild to moderate MR, and HLD, who presents to cardiology clinic for follow up.     Patient continues to feel well with no cardiovascular complaints.  Continues to bike 12 miles twice weekly and denies any exertional symptoms.  He also denies dizziness, lightheadedness, syncope.  Also denies orthopnea or PND.    Family hx:  Parents, aunts, uncles and grandparents all lived to be in their late 80s to 90s.    Results for orders placed during the hospital encounter of 03/14/24    Echo    Interpretation Summary    Left Ventricle: The left ventricle is moderately dilated. Mildly increased ventricular mass. Normal wall thickness. There is mild eccentric hypertrophy. Normal wall motion. There is normal systolic function. Biplane (2D) method of discs ejection fraction is 58%. There is indeterminate diastolic function.    Right Ventricle: Normal right ventricular cavity size. Systolic function is normal. Pacemaker lead present in the ventricle.    Left Atrium: Left atrium is moderately dilated.    Right Atrium: Right atrium is mildly dilated. Lead present in the right atrium.    Aortic Valve: The aortic valve is a trileaflet valve. There is no stenosis. There is mild aortic regurgitation.    Mitral Valve: Moderately thickened anterior leaflet. There is mild anterior leaflet prolapse. There is no stenosis. There is mild to moderate regurgitation.    Tricuspid Valve: The tricuspid valve is structurally normal. There is trace regurgitation.    Pulmonic Valve: There is no significant regurgitation.    Aorta: Aortic root is normal in size measuring 3.1 cm.    Pulmonary Artery: The estimated pulmonary artery systolic pressure is 27 mmHg.    IVC/SVC: Normal venous pressure at 3 mmHg.    Pericardium: There is no pericardial  effusion.    Echocardiogram 4/2021:   EF 55-60%, mild AI, mild systolic prolapse of mitral valve with mild MR, PASP 38mmHg      Lab Results   Component Value Date    CREATININE 1.22 (H) 03/04/2024    CREATININE 1.51 (H) 11/07/2023    CREATININE 2.05 (H) 11/07/2023     Past Medical History    Patient Active Problem List   Diagnosis    Allergic rhinitis    Gastroesophageal reflux disease    Sick sinus syndrome    S/P placement of cardiac pacemaker    Hypercholesteremia    History of DVT (deep vein thrombosis)    Valvular heart disease    Mild aortic regurgitation    Moderate mitral regurgitation       Surgical History    Past Surgical History:   Procedure Laterality Date    INSERT / REPLACE / REMOVE PACEMAKER         Social History     Socioeconomic History    Marital status: Single   Tobacco Use    Smoking status: Never    Smokeless tobacco: Never   Substance and Sexual Activity    Alcohol use: Not Currently    Drug use: Not Currently     Social Determinants of Health     Food Insecurity: No Food Insecurity (12/1/2023)    Hunger Vital Sign     Worried About Running Out of Food in the Last Year: Never true     Ran Out of Food in the Last Year: Never true       Family History   Problem Relation Name Age of Onset    Heart attack Mother      Hypertension Father       Review of patient's allergies indicates:   Allergen Reactions    Aspirin Other (See Comments)     Stomach burning    Atorvastatin      Other reaction(s): Stomach cramps    Penicillins      Other reaction(s): rash  Other reaction(s): rash      Nsaids (non-steroidal anti-inflammatory drug) Nausea Only       Current Medications    Current Outpatient Medications   Medication Instructions    meclizine (ANTIVERT) 25 mg, Oral, 3 times daily PRN    multivitamin-minerals-lutein Tab 1 tablet, Oral, Daily    sildenafiL (VIAGRA) 50 mg, Oral, Daily PRN    vitamin E 100 Units, Oral, Daily         ROS:   Denies headaches, changes in vision, nausea, vomiting, fever, chills,  "chest pain, palpitations, dyspnea, abdominal pain, changes in urinary or bowel habits.    Objective:     BP Readings from Last 3 Encounters:   07/15/24 119/87   06/07/24 126/83   04/29/24 119/78        Pulse Readings from Last 3 Encounters:   07/15/24 64   06/07/24 75   04/29/24 79        Temp Readings from Last 3 Encounters:   07/15/24 98.8 °F (37.1 °C) (Oral)   06/07/24 98.2 °F (36.8 °C) (Oral)   04/29/24 98.4 °F (36.9 °C)       Wt Readings from Last 3 Encounters:   07/15/24 73.9 kg (163 lb)   06/07/24 75.3 kg (166 lb)   04/29/24 75.6 kg (166 lb 9.6 oz)         PE:  Blood pressure 119/87, pulse 64, temperature 98.8 °F (37.1 °C), temperature source Oral, resp. rate (!) 24, height 5' 10" (1.778 m), weight 73.9 kg (163 lb), SpO2 100%.   General: alert and oriented/no acute distress  Eye: EOMI/normal conjunctiva/no xanthelasma  HENT: normocephalic/moist oral mucosa  Neck: supple/nontender/no carotid bruit  Respiratory: lungs CTA/nonlabored respirations/BS equal/symmetrical expansion/no  chest wall tenderness  Cardiovascular: normal rate/normal rhythm/systolic blowing murmur 3/6 /normal peripheral perfusion/no  edema/no JVD  Gastrointestinal: soft/nontender  Musculoskeletal: normal ROM  Integumentary: warm/dry/pink/intact  Neurologic: alert/oriented/normal sensory/no focal deficits  Psychiatric: cooperative/appropriate mood and affect/normal judgment                                                                                                                                                                                                                                                                                                                                                                                      Last BMP BMP  Lab Results   Component Value Date     03/04/2024    K 4.2 03/04/2024     (H) 03/04/2024    CO2 28 03/04/2024    BUN 13.1 03/04/2024    CREATININE 1.22 (H) 03/04/2024    CALCIUM " 8.6 (L) 03/04/2024    EGFRNORACEVR >60 03/04/2024      Last CBC     Lab Results   Component Value Date    WBC 7.68 11/07/2023    HGB 12.4 (L) 11/07/2023    HCT 39.5 (L) 11/07/2023    MCV 84.8 11/07/2023     11/07/2023           BNP    Lab Results   Component Value Date    BNP 23.7 04/28/2021    BNP 43.2 10/07/2017     Last lipids    Lab Results   Component Value Date    CHOL 180 10/20/2023    CHOL 220 (H) 02/22/2023    CHOL 191 05/21/2021    HDL 56 10/20/2023    HDL 64 (H) 02/22/2023    HDL 54 05/21/2021    .00 10/20/2023    .00 (H) 02/22/2023    .00 05/21/2021    TRIG 44 10/20/2023    TRIG 63 02/22/2023    TRIG 70 05/21/2021    TOTALCHOLEST 3 10/20/2023    TOTALCHOLEST 3 02/22/2023    TOTALCHOLEST 4 05/21/2021      LFT     Lab Results   Component Value Date    ALT 20 11/07/2023    ALT 22 08/04/2023    ALT 17 06/08/2023    AST 27 11/07/2023    AST 22 08/04/2023    AST 22 06/08/2023         Plan:   67 y.o. male with a PMH significant for sick sinus syndrome s/p Medtronic dual chamber PPM 7/2021, mild AI, mild to moderate MR, and HLD, who presents to cardiology clinic for follow up.      Sick Sinus Syndrome  -Dual-chamber PPM on 7.21.21 (Medtronic)  -Last device interrogation 3/24 showed 95% A pacing, 0.1%V pacing. Episodes of AHR and VHR, one episode of elevated Atrial and ventricular rates to 240bpm in Feb 2024 appears 1:1 most likely atrial tachycardia and lasted 1.5 minutes, pt did not recall feeling this episode  -Continue device interrogation q 6 months    Valvular Heart Disease  TTE  3/14/2024 showed EF 58%, mild AI, mild to mod MR due to mild anterior leaflet prolapse.  - TTE in 4/2021 showed EF 55 to 60%, mild mitral regurgitation due to mild prolapse, mild aortic regurgitation, trace tricuspid regurgitation with RVSP 38 and trace pulmonic regurgitation. (TTE 4.14.21)  - Moderate MR -stable (previously noted on TTE 9/2017), Trace TR, mild AR, (TTE 3.11.20)  - pt repeat echo in 1  year    Hypercholesterolemia  Last lipid profile shows , 2 , HDL 56 and TG 44.  Patient has been on Vascepa for long time for unclear reason.  His triglycerides even before starting Vascepa was 65.  Told him he could stop it.    Patient with no cardiac risk factors.  ASCVD score 8% mostly driven by age and race.  Ideal score is 7.5%.  As such I do not believe a statin will alter his overall risk significantly.  Moreover patient would not like to start a statin and he reports no family history of heart disease.    Judit Brand MD

## 2024-08-15 ENCOUNTER — TELEPHONE (OUTPATIENT)
Dept: CARDIOLOGY | Facility: CLINIC | Age: 69
End: 2024-08-15
Payer: COMMERCIAL

## 2024-08-15 NOTE — TELEPHONE ENCOUNTER
Patient called stating he was suppose to have an Echo done on this morning, but received a call on yesterday informing that Echo has been cancelled/denied. Pt stated he called his insurance company and was told that the Echo was neither denied nor approved because there wasn't an request for approval for the testing. Would like a call back to further discuss.

## 2024-09-10 ENCOUNTER — CLINICAL SUPPORT (OUTPATIENT)
Dept: CARDIOLOGY | Facility: CLINIC | Age: 69
End: 2024-09-10
Payer: COMMERCIAL

## 2024-09-10 DIAGNOSIS — Z95.0 S/P PLACEMENT OF CARDIAC PACEMAKER: Primary | ICD-10-CM

## 2024-09-10 PROCEDURE — 93280 PM DEVICE PROGR EVAL DUAL: CPT | Mod: PBBFAC | Performed by: INTERNAL MEDICINE

## 2024-09-25 ENCOUNTER — OFFICE VISIT (OUTPATIENT)
Dept: FAMILY MEDICINE | Facility: CLINIC | Age: 69
End: 2024-09-25
Payer: COMMERCIAL

## 2024-09-25 VITALS
HEIGHT: 70 IN | SYSTOLIC BLOOD PRESSURE: 127 MMHG | RESPIRATION RATE: 18 BRPM | TEMPERATURE: 98 F | DIASTOLIC BLOOD PRESSURE: 88 MMHG | HEART RATE: 64 BPM | OXYGEN SATURATION: 100 % | WEIGHT: 163.63 LBS | BODY MASS INDEX: 23.43 KG/M2

## 2024-09-25 DIAGNOSIS — E78.00 HYPERCHOLESTEREMIA: Primary | ICD-10-CM

## 2024-09-25 DIAGNOSIS — Z00.00 WELLNESS EXAMINATION: ICD-10-CM

## 2024-09-25 DIAGNOSIS — Z12.5 PROSTATE CANCER SCREENING: ICD-10-CM

## 2024-09-25 DIAGNOSIS — N17.9 AKI (ACUTE KIDNEY INJURY): ICD-10-CM

## 2024-09-25 DIAGNOSIS — Z23 IMMUNIZATION DUE: ICD-10-CM

## 2024-09-25 DIAGNOSIS — N52.9 ERECTILE DYSFUNCTION, UNSPECIFIED ERECTILE DYSFUNCTION TYPE: ICD-10-CM

## 2024-09-25 DIAGNOSIS — I49.5 SICK SINUS SYNDROME: ICD-10-CM

## 2024-09-25 PROCEDURE — 99213 OFFICE O/P EST LOW 20 MIN: CPT | Mod: PBBFAC

## 2024-09-25 NOTE — PROGRESS NOTES
[facility] Office Visit Note    CC:   Follow-up and Medicare AWV Follow Up (Patient here for wellness visit, no complaints.)       HPI:  Rad Reyna is a 69 y.o. male who presents to [facility] for routine f/u.    HLD- Not on any medications. Last lipid panel 10/2023, .     Erectile Dysfunction- On sildenafil 50mg as needed. Feels like medication resolves his symptoms. No complaints.     Sick sinus syndrome, mild aortic/mitral regurgitation, pacemaker- Recently seen by cardiology on 7/15/24, episode of elevated atrial and ventricular rates to 240bpm in Feb 2024, however patient didn't recall episode. They will continue device interrogation q 6months. TTE 3/2024 w/ EF 58@, planning to repeat in one year.       PMHx  HLD  Sick Sinus Syndrome, Mild Aortic/Mitral regurgitation, [employer] Biventricular pacemaker- follows with Cardiology    Health Maintenance   PSA due  Shingrix due- declined  Cologuard negative 12/11/2023  Hep C screening 3/2024 neg    Review of Systems:   Review of Systems   Constitutional:  Negative for chills and fever.   Respiratory:  Negative for cough, shortness of breath and wheezing.    Cardiovascular:  Negative for chest pain and palpitations.   Gastrointestinal:  Negative for abdominal pain, diarrhea, nausea and vomiting.   Genitourinary:  Negative for dysuria.   Neurological:  Negative for dizziness, tingling, weakness and headaches.        Current Outpatient Medications   Medication Instructions    multivitamin-minerals-lutein Tab 1 tablet, Oral, Daily    sildenafiL (VIAGRA) 50 mg, Oral, Daily PRN    vitamin E 100 Units, Oral, Daily        Physical Exam:   Vitals:    09/25/24 1053   BP: 127/88   Pulse: 64   Resp: 18   Temp: 98.2 °F (36.8 °C)      Physical Exam  Vitals reviewed.   Constitutional:       General: He is not in acute distress.  Eyes:      Extraocular Movements: Extraocular movements intact.   Cardiovascular:      Rate and Rhythm: Normal rate and regular rhythm.       Pulses: Normal pulses.      Heart sounds: Normal heart sounds. No murmur heard.     No gallop.   Pulmonary:      Effort: Pulmonary effort is normal. No respiratory distress.      Breath sounds: Normal breath sounds.   Abdominal:      General: Abdomen is flat. Bowel sounds are normal. There is no distension.      Palpations: Abdomen is soft.      Tenderness: There is no abdominal tenderness.   Musculoskeletal:      Right lower leg: No edema.      Left lower leg: No edema.   Skin:     General: Skin is warm and dry.      Capillary Refill: Capillary refill takes less than 2 seconds.   Neurological:      General: No focal deficit present.      Mental Status: He is alert and oriented to person, place, and time.          Assessment/Plan:  1. Hypercholesteremia  - ASCVD risk 8.1%, thought to be mostly due to age and race per cards  - Can continue holding off on statin therapy , will not alter his overall risk significantly per cards  - Will repeat lipid panel today since last almost one year ago  - Lipid Panel; Future    2. Erectile dysfunction  - Doing well on current regimen   - Continue sildenafil 50mg as needed    3. Sick sinus syndrome/valvular heart disease  - Doing well overall, no symptoms  - Continue following with cardiology    4. Prostate cancer screening  - Not having any symptoms  - Previous PSA wnl in 12/2022, will get a repeat today  - PSA, Screening; Future    5. Wellness examination  - Overall continues to do well  - Will get routine lab work today   - CBC Auto Differential; Future  - Comprehensive Metabolic Panel; Future  - PSA, Screening; Future  - Lipid Panel; Future    6. Immunization due  - Shingrix vaccine offered  - Patient would like to continue holding off at this time       RTC in 3-4 months for routine follow up with PCP      Demi Healy MD    [facility] Resident, -1

## 2025-01-27 NOTE — PROGRESS NOTES
"[facility] OFFICE VISIT NOTE  Rad Reyna  69 y.o.  [id number]  2025      Chief Complaint: Routine follow-up  HPI:  No complaints or concerns  Erectile dysfunction - improved with sildenafil   Sick sinus syndrome, mild aortic regurgitation, moderate mitral regurgitation - F/u with cardiology   Denies HA, vision/hearing change, CP, SOB, dizziness, abdominal pain, constipation/diarrhea, urinary frequency/urgency, dysuria, depression/anxiety    PMH:  HLD  Erectile dysfunction  Sick sinus syndrome  Mild aortic regurgitation  Moderate mitral regurgitation    PSH:  Cardiac pacemaker placement ()  Appendectomy     SH:  Smoking tobacco: 16-18 y.o., 1 pack per week    Drinking alcohol: 18-20's, Beer 6 pack every Saturday  Recreational drugs: 16-18 y.o., Marijuana   Education: High school   Occupation: Retired, 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 Next due 2026    Immunizations:  Influenza: Completed (10/22/2024)  PCV-20: Completed (2023)    Care Team:  Cardiology: Judit Brand MD     Medications:  See list below  Current Outpatient Medications   Medication Instructions    sildenafiL (VIAGRA) 50 mg, Oral, Daily PRN       Vitals:    25 0943   BP: 121/77   BP Location: Right arm   Patient Position: Sitting   Pulse: 66   Resp: 20   Temp: 98 °F (36.7 °C)   TempSrc: Oral   SpO2: 100%   Weight: 73.7 kg (162 lb 6.4 oz)   Height: 5' 10" (1.778 m)       Physical Exam  GEN: NAD  HEENT: PERRL  NECK: No LAD, no bruit  RESP: CTAB  CV: RRR, S1/S2, no murmur  ABD: BS+, no tenderness  EXT: No edema or deformity    Assessment and Plan:    Erectile dysfunction  Continue sildenafil 50 mg PRN (refilled today, 2025)    Sick sinus " syndrome  Mild aortic regurgitation  Moderate mitral regurgitation  TTE (3/14/2024): EF 58%, next Echo 3/2025  Keep f/u schedule with cardiology, next schedule 3/11/2025    Hx of HLD  Per cardiology  No cardiac risk factors  ASCVC score 8% 2/2 age & race  No statin needed since will not alter overall risk significantly    Patient voices understanding and agrees to the plan discussed. All patient's questions have been answered at this time. He is scheduled RTC approx. 5/30/2025, sooner if needed.     Labs:  CBC, CMP, Lipid panel next visit    Harmeet Rosenbaum MD  [facility], [facility]  01/30/2025

## 2025-01-30 ENCOUNTER — OFFICE VISIT (OUTPATIENT)
Dept: FAMILY MEDICINE | Facility: CLINIC | Age: 70
End: 2025-01-30
Payer: COMMERCIAL

## 2025-01-30 VITALS
BODY MASS INDEX: 23.25 KG/M2 | WEIGHT: 162.38 LBS | SYSTOLIC BLOOD PRESSURE: 121 MMHG | TEMPERATURE: 98 F | RESPIRATION RATE: 20 BRPM | DIASTOLIC BLOOD PRESSURE: 77 MMHG | OXYGEN SATURATION: 100 % | HEIGHT: 70 IN | HEART RATE: 66 BPM

## 2025-01-30 DIAGNOSIS — N52.9 ERECTILE DYSFUNCTION, UNSPECIFIED ERECTILE DYSFUNCTION TYPE: Primary | ICD-10-CM

## 2025-01-30 PROBLEM — H69.92 EUSTACHIAN TUBE DYSFUNCTION, LEFT: Status: ACTIVE | Noted: 2025-01-30

## 2025-01-30 PROBLEM — G47.00 INSOMNIA: Status: ACTIVE | Noted: 2025-01-30

## 2025-01-30 PROBLEM — E78.5 HYPERLIPOPROTEINEMIA: Status: ACTIVE | Noted: 2025-01-30

## 2025-01-30 PROBLEM — D64.9 ANEMIA: Status: ACTIVE | Noted: 2025-01-30

## 2025-01-30 PROBLEM — Z12.5 SCREENING PSA (PROSTATE SPECIFIC ANTIGEN): Status: ACTIVE | Noted: 2025-01-30

## 2025-01-30 PROBLEM — I34.0 MITRAL VALVE INSUFFICIENCY: Status: ACTIVE | Noted: 2025-01-30

## 2025-01-30 PROCEDURE — 99213 OFFICE O/P EST LOW 20 MIN: CPT | Mod: PBBFAC

## 2025-01-30 RX ORDER — SILDENAFIL 100 MG/1
50 TABLET, FILM COATED ORAL DAILY PRN
Qty: 10 TABLET | Refills: 5 | Status: SHIPPED | OUTPATIENT
Start: 2025-01-30 | End: 2025-07-29

## 2025-02-10 ENCOUNTER — OFFICE VISIT (OUTPATIENT)
Dept: URGENT CARE | Facility: CLINIC | Age: 70
End: 2025-02-10
Payer: COMMERCIAL

## 2025-02-10 VITALS
TEMPERATURE: 98 F | DIASTOLIC BLOOD PRESSURE: 95 MMHG | RESPIRATION RATE: 18 BRPM | SYSTOLIC BLOOD PRESSURE: 131 MMHG | WEIGHT: 160.63 LBS | HEIGHT: 70 IN | BODY MASS INDEX: 23 KG/M2 | OXYGEN SATURATION: 100 % | HEART RATE: 62 BPM

## 2025-02-10 DIAGNOSIS — K08.89 PAIN, DENTAL: Primary | ICD-10-CM

## 2025-02-10 PROCEDURE — 99213 OFFICE O/P EST LOW 20 MIN: CPT | Mod: S$PBB,,, | Performed by: NURSE PRACTITIONER

## 2025-02-10 PROCEDURE — 63600175 PHARM REV CODE 636 W HCPCS: Mod: JZ,TB

## 2025-02-10 PROCEDURE — 99214 OFFICE O/P EST MOD 30 MIN: CPT | Mod: PBBFAC | Performed by: NURSE PRACTITIONER

## 2025-02-10 RX ORDER — KETOROLAC TROMETHAMINE 30 MG/ML
30 INJECTION, SOLUTION INTRAMUSCULAR; INTRAVENOUS
Status: COMPLETED | OUTPATIENT
Start: 2025-02-10 | End: 2025-02-10

## 2025-02-10 RX ORDER — HYDROCODONE BITARTRATE AND ACETAMINOPHEN 7.5; 325 MG/1; MG/1
1 TABLET ORAL EVERY 8 HOURS PRN
Qty: 3 TABLET | Refills: 0 | Status: SHIPPED | OUTPATIENT
Start: 2025-02-10 | End: 2025-02-11

## 2025-02-10 RX ADMIN — KETOROLAC TROMETHAMINE 30 MG: 30 INJECTION, SOLUTION INTRAMUSCULAR at 10:02

## 2025-02-10 NOTE — PROGRESS NOTES
"Subjective:      Patient ID: Rad Reyna is a 69 y.o. male.    Vitals:  height is 5' 10" (1.778 m) and weight is 72.8 kg (160 lb 9.6 oz). His oral temperature is 98 °F (36.7 °C). His blood pressure is 131/95 (abnormal) and his pulse is 62. His respiration is 18 and oxygen saturation is 100%.     Chief Complaint: Dental Pain (Patient states lower right side dental pain x 2 days. States used dr. PADILLA mouth wash and oragel with mild relief.)    Dental Pain      as stated in chief complaint.  Patient states he has not taking any medication for treatment of pain but has NSAIDs and Tylenol make his stomach burn.  Patient denies any stomach ulcers or increased bleeding or allergic reactions.  Patient reports he has a dental appointment tomorrow with Dr. Bueno's office came in for pain control until he can make it to his dental visit  ROS   Objective:     Physical Exam   Constitutional: He appears well-developed.  Non-toxic appearance. He does not appear ill. No distress.   HENT:   Head: Atraumatic.   Nose: No purulent discharge. Right sinus exhibits no maxillary sinus tenderness and no frontal sinus tenderness. Left sinus exhibits no maxillary sinus tenderness and no frontal sinus tenderness.   Mouth/Throat: Uvula is midline, oropharynx is clear and moist and mucous membranes are normal. No dental abscesses. No tonsillar exudate.       Eyes: Right eye exhibits no discharge. Left eye exhibits no discharge. Extraocular movement intact   Neck: Neck supple. No neck rigidity present.   Cardiovascular: Regular rhythm.   Pulmonary/Chest: Effort normal and breath sounds normal. No respiratory distress. He has no wheezes. He has no rhonchi. He has no rales.   Lymphadenopathy:     He has no cervical adenopathy.   Neurological: He is alert.   Skin: Skin is warm, dry and not diaphoretic.   Psychiatric: His behavior is normal.   Nursing note and vitals reviewed.chaperone present (Girlfriend)         Assessment:     1. Pain, dental  "       Plan:   ER precautions given and discussed  Patient known to tolerate Toradol, patient reports no true allergy to NSAID medications, with shared decision-making patient opted for Toradol IM injection as this medication by passes GI tract patient verbalized understanding and agreed with plan of care, Patient instructed to keep dental appointment tomorrow 02/11/2025  Prescription management for pain with Toradol IM injection and Norco 7.5 for pain  Pain, dental  -     ketorolac injection 30 mg  -     HYDROcodone-acetaminophen (NORCO) 7.5-325 mg per tablet; Take 1 tablet by mouth every 8 (eight) hours as needed for Pain.  Dispense: 3 tablet; Refill: 0

## 2025-02-10 NOTE — PATIENT INSTRUCTIONS
Take your prescriptions according to the directions on the labels and make sure you keep your upcoming  dental valentin 2/11/2025    You can take Tylenol as well. Up to 1,000mg at a time, up to 4 times per day.  Also Oragel.  Soft foods or liquids.  Stop smoking if you smoke.  Avoid sugar.  Mouthwash that does not contain alcohol.    - Increase your water intake to 5-6 bottles per day  Please read the attached information about NSAIDs.    Please use the prescriptions that were sent for you.     You got a Toradol shot in clinic today. For the next 8 hours do not take:  Ibuprofen  Naproxen  Advil  Aleve  Motrin  Ketorolac   Diclofenac  Meloxicam   - If you start to have fevers 100.4+, significant lower back/lower abdominal pain, vomiting, chills/body aches, or worsening bladder/urination symptoms - go to the ER.

## 2025-02-11 DIAGNOSIS — I34.0 MODERATE MITRAL REGURGITATION: Primary | ICD-10-CM

## 2025-02-12 ENCOUNTER — TELEPHONE (OUTPATIENT)
Dept: CARDIOLOGY | Facility: HOSPITAL | Age: 70
End: 2025-02-12
Payer: COMMERCIAL

## 2025-02-12 NOTE — TELEPHONE ENCOUNTER
----- Message from Judit Brand MD sent at 2/11/2025  4:49 PM CST -----  Done thanks  ----- Message -----  From: Merari Fletcher LPN  Sent: 2/10/2025   2:54 PM CST  To: Judit Brand MD    Pt was to have and echo prior to follow up, it was denied last year, but it was to soon. Can you reorder the echo?

## 2025-02-13 ENCOUNTER — HOSPITAL ENCOUNTER (EMERGENCY)
Facility: HOSPITAL | Age: 70
Discharge: HOME | End: 2025-02-14
Attending: FAMILY MEDICINE
Payer: COMMERCIAL

## 2025-02-13 DIAGNOSIS — R07.89 ATYPICAL CHEST PAIN: Primary | ICD-10-CM

## 2025-02-13 DIAGNOSIS — K29.70 GASTRITIS, PRESENCE OF BLEEDING UNSPECIFIED, UNSPECIFIED CHRONICITY, UNSPECIFIED GASTRITIS TYPE: ICD-10-CM

## 2025-02-13 LAB
BASOPHILS # BLD AUTO: 0.06 X10(3)/MCL
BASOPHILS NFR BLD AUTO: 1.3 %
EOSINOPHIL # BLD AUTO: 0.18 X10(3)/MCL (ref 0–0.9)
EOSINOPHIL NFR BLD AUTO: 3.9 %
ERYTHROCYTE [DISTWIDTH] IN BLOOD BY AUTOMATED COUNT: 13.8 % (ref 11.5–17)
HCT VFR BLD AUTO: 36.6 % (ref 42–52)
HGB BLD-MCNC: 11.6 G/DL (ref 14–18)
IMM GRANULOCYTES # BLD AUTO: 0 X10(3)/MCL (ref 0–0.04)
IMM GRANULOCYTES NFR BLD AUTO: 0 %
LYMPHOCYTES # BLD AUTO: 1.69 X10(3)/MCL (ref 0.6–4.6)
LYMPHOCYTES NFR BLD AUTO: 36.2 %
MCH RBC QN AUTO: 26.5 PG (ref 27–31)
MCHC RBC AUTO-ENTMCNC: 31.7 G/DL (ref 33–36)
MCV RBC AUTO: 83.8 FL (ref 80–94)
MONOCYTES # BLD AUTO: 0.5 X10(3)/MCL (ref 0.1–1.3)
MONOCYTES NFR BLD AUTO: 10.7 %
NEUTROPHILS # BLD AUTO: 2.24 X10(3)/MCL (ref 2.1–9.2)
NEUTROPHILS NFR BLD AUTO: 47.9 %
NRBC BLD AUTO-RTO: 0 %
PLATELET # BLD AUTO: 166 X10(3)/MCL (ref 130–400)
PMV BLD AUTO: 9.4 FL (ref 7.4–10.4)
RBC # BLD AUTO: 4.37 X10(6)/MCL (ref 4.7–6.1)
WBC # BLD AUTO: 4.67 X10(3)/MCL (ref 4.5–11.5)

## 2025-02-13 PROCEDURE — 80053 COMPREHEN METABOLIC PANEL: CPT | Performed by: FAMILY MEDICINE

## 2025-02-13 PROCEDURE — 82550 ASSAY OF CK (CPK): CPT | Performed by: FAMILY MEDICINE

## 2025-02-13 PROCEDURE — 85025 COMPLETE CBC W/AUTO DIFF WBC: CPT | Performed by: FAMILY MEDICINE

## 2025-02-13 PROCEDURE — 84484 ASSAY OF TROPONIN QUANT: CPT | Performed by: FAMILY MEDICINE

## 2025-02-13 PROCEDURE — 99284 EMERGENCY DEPT VISIT MOD MDM: CPT | Mod: 25

## 2025-02-13 PROCEDURE — 83690 ASSAY OF LIPASE: CPT | Performed by: FAMILY MEDICINE

## 2025-02-13 PROCEDURE — 83735 ASSAY OF MAGNESIUM: CPT | Performed by: FAMILY MEDICINE

## 2025-02-13 RX ORDER — ONDANSETRON HYDROCHLORIDE 2 MG/ML
4 INJECTION, SOLUTION INTRAVENOUS
Status: COMPLETED | OUTPATIENT
Start: 2025-02-13 | End: 2025-02-14

## 2025-02-13 RX ORDER — FAMOTIDINE 10 MG/ML
20 INJECTION INTRAVENOUS
Status: COMPLETED | OUTPATIENT
Start: 2025-02-13 | End: 2025-02-14

## 2025-02-13 RX ORDER — DICYCLOMINE HYDROCHLORIDE 10 MG/ML
20 INJECTION INTRAMUSCULAR
Status: COMPLETED | OUTPATIENT
Start: 2025-02-13 | End: 2025-02-14

## 2025-02-14 VITALS
WEIGHT: 162.06 LBS | HEIGHT: 70 IN | TEMPERATURE: 98 F | OXYGEN SATURATION: 98 % | SYSTOLIC BLOOD PRESSURE: 144 MMHG | HEART RATE: 60 BPM | BODY MASS INDEX: 23.2 KG/M2 | DIASTOLIC BLOOD PRESSURE: 90 MMHG | RESPIRATION RATE: 13 BRPM

## 2025-02-14 LAB
ALBUMIN SERPL-MCNC: 3.5 G/DL (ref 3.4–4.8)
ALBUMIN/GLOB SERPL: 1.1 RATIO (ref 1.1–2)
ALP SERPL-CCNC: 60 UNIT/L (ref 40–150)
ALT SERPL-CCNC: 7 UNIT/L (ref 0–55)
ANION GAP SERPL CALC-SCNC: 8 MEQ/L
AST SERPL-CCNC: 15 UNIT/L (ref 5–34)
BILIRUB SERPL-MCNC: 0.6 MG/DL
BUN SERPL-MCNC: 9.1 MG/DL (ref 8.4–25.7)
CALCIUM SERPL-MCNC: 9 MG/DL (ref 8.8–10)
CHLORIDE SERPL-SCNC: 101 MMOL/L (ref 98–107)
CK SERPL-CCNC: 141 U/L (ref 30–200)
CO2 SERPL-SCNC: 27 MMOL/L (ref 23–31)
CREAT SERPL-MCNC: 1 MG/DL (ref 0.72–1.25)
CREAT/UREA NIT SERPL: 9
GFR SERPLBLD CREATININE-BSD FMLA CKD-EPI: >60 ML/MIN/1.73/M2
GLOBULIN SER-MCNC: 3.3 GM/DL (ref 2.4–3.5)
GLUCOSE SERPL-MCNC: 104 MG/DL (ref 82–115)
HOLD SPECIMEN: NORMAL
HOLD SPECIMEN: NORMAL
LIPASE SERPL-CCNC: 14 U/L
MAGNESIUM SERPL-MCNC: 1.8 MG/DL (ref 1.6–2.6)
OHS QRS DURATION: 92 MS
OHS QTC CALCULATION: 402 MS
POTASSIUM SERPL-SCNC: 3.6 MMOL/L (ref 3.5–5.1)
PROT SERPL-MCNC: 6.8 GM/DL (ref 5.8–7.6)
SODIUM SERPL-SCNC: 136 MMOL/L (ref 136–145)
TROPONIN I SERPL-MCNC: <0.01 NG/ML (ref 0–0.04)

## 2025-02-14 PROCEDURE — 96375 TX/PRO/DX INJ NEW DRUG ADDON: CPT

## 2025-02-14 PROCEDURE — 63600175 PHARM REV CODE 636 W HCPCS: Performed by: FAMILY MEDICINE

## 2025-02-14 PROCEDURE — 25000003 PHARM REV CODE 250: Performed by: FAMILY MEDICINE

## 2025-02-14 PROCEDURE — 96374 THER/PROPH/DIAG INJ IV PUSH: CPT

## 2025-02-14 PROCEDURE — 96372 THER/PROPH/DIAG INJ SC/IM: CPT | Performed by: FAMILY MEDICINE

## 2025-02-14 RX ORDER — FAMOTIDINE 20 MG/1
20 TABLET, FILM COATED ORAL 2 TIMES DAILY
Qty: 20 TABLET | Refills: 0 | Status: SHIPPED | OUTPATIENT
Start: 2025-02-14 | End: 2025-02-16 | Stop reason: ALTCHOICE

## 2025-02-14 RX ORDER — LIDOCAINE HYDROCHLORIDE 20 MG/ML
10 SOLUTION OROPHARYNGEAL
Status: COMPLETED | OUTPATIENT
Start: 2025-02-14 | End: 2025-02-14

## 2025-02-14 RX ORDER — DICYCLOMINE HYDROCHLORIDE 10 MG/1
10 CAPSULE ORAL EVERY 6 HOURS PRN
Qty: 20 CAPSULE | Refills: 0 | Status: SHIPPED | OUTPATIENT
Start: 2025-02-14 | End: 2025-02-24

## 2025-02-14 RX ORDER — ONDANSETRON 4 MG/1
4 TABLET, ORALLY DISINTEGRATING ORAL EVERY 6 HOURS PRN
Qty: 10 TABLET | Refills: 0 | Status: SHIPPED | OUTPATIENT
Start: 2025-02-14 | End: 2025-02-19

## 2025-02-14 RX ADMIN — FAMOTIDINE 20 MG: 10 INJECTION, SOLUTION INTRAVENOUS at 12:02

## 2025-02-14 RX ADMIN — LIDOCAINE HYDROCHLORIDE 10 ML: 20 SOLUTION ORAL at 01:02

## 2025-02-14 RX ADMIN — ALUMINUM HYDROXIDE AND MAGNESIUM HYDROXIDE 30 ML: 200; 200 SUSPENSION ORAL at 01:02

## 2025-02-14 RX ADMIN — DICYCLOMINE HYDROCHLORIDE 20 MG: 10 INJECTION, SOLUTION INTRAMUSCULAR at 12:02

## 2025-02-14 RX ADMIN — ONDANSETRON 4 MG: 2 INJECTION INTRAMUSCULAR; INTRAVENOUS at 12:02

## 2025-02-14 NOTE — ED PROVIDER NOTES
Encounter Date: 2/13/2025       History     Chief Complaint   Patient presents with    Chest Pain     Chest pain all day, N&V x1 tonight.     69-year-old gentleman presents emergency room complaints of epigastric abdominal pain, nausea, vomiting.  Patient has been experiencing this  beginning his morning.  Patient reports having a history of gastroesophageal reflux and gastritis.  No longer on medications for his reflux.  Patient denies constipation or diarrhea    The history is provided by the patient.   .  Review of patient's allergies indicates:   Allergen Reactions    Aspirin Other (See Comments)     Stomach burning    Atorvastatin      Other reaction(s): Stomach cramps    Nsaids (non-steroidal anti-inflammatory drug) Nausea Only    Penicillins Rash     Other reaction(s): rash     Past Medical History:   Diagnosis Date    Atrial fibrillation     Hyperlipidemia     Hypertension      Past Surgical History:   Procedure Laterality Date    INSERT / REPLACE / REMOVE PACEMAKER       Family History   Problem Relation Name Age of Onset    Heart attack Mother      Hypertension Father       Social History     Tobacco Use    Smoking status: Never    Smokeless tobacco: Never   Substance Use Topics    Alcohol use: Not Currently    Drug use: Not Currently     Review of Systems   Constitutional:  Negative for chills, fatigue and fever.   HENT:  Negative for ear pain, rhinorrhea and sore throat.    Eyes:  Negative for photophobia and pain.   Respiratory:  Negative for cough, shortness of breath and wheezing.    Cardiovascular:  Positive for chest pain.   Gastrointestinal:  Positive for abdominal pain, nausea and vomiting. Negative for diarrhea.   Genitourinary:  Negative for dysuria.   Neurological:  Negative for dizziness, weakness and headaches.   All other systems reviewed and are negative.      Physical Exam     Initial Vitals [02/13/25 2305]   BP Pulse Resp Temp SpO2   (!) 144/90 85 20 98.2 °F (36.8 °C) 98 %      MAP        --         Physical Exam    Nursing note and vitals reviewed.  Constitutional: He appears well-developed and well-nourished.   HENT:   Head: Normocephalic and atraumatic. Mouth/Throat: Oropharynx is clear and moist.   Eyes: EOM are normal. Pupils are equal, round, and reactive to light.   Neck: Neck supple.   Normal range of motion.  Cardiovascular:  Normal rate, regular rhythm and intact distal pulses.     Exam reveals no gallop and no friction rub.       Murmur (Systolic murmur appreciated.) heard.  Pulmonary/Chest: Breath sounds normal. No respiratory distress. He has no wheezes. He has no rhonchi. He has no rales.   Abdominal: Abdomen is soft. Bowel sounds are normal. He exhibits no distension. There is no abdominal tenderness.   Musculoskeletal:         General: Normal range of motion.      Cervical back: Normal range of motion and neck supple.     Neurological: He is alert and oriented to person, place, and time. He has normal strength.   Skin: Skin is warm and dry.   Psychiatric: He has a normal mood and affect. His behavior is normal. Judgment and thought content normal.         ED Course   Procedures  Labs Reviewed   CBC WITH DIFFERENTIAL - Abnormal       Result Value    WBC 4.67      RBC 4.37 (*)     Hgb 11.6 (*)     Hct 36.6 (*)     MCV 83.8      MCH 26.5 (*)     MCHC 31.7 (*)     RDW 13.8      Platelet 166      MPV 9.4      Neut % 47.9      Lymph % 36.2      Mono % 10.7      Eos % 3.9      Basophil % 1.3      Imm Grans % 0.0      Neut # 2.24      Lymph # 1.69      Mono # 0.50      Eos # 0.18      Baso # 0.06      Imm Gran # 0.00      NRBC% 0.0     MAGNESIUM - Normal    Magnesium Level 1.80     LIPASE - Normal    Lipase Level 14     CK - Normal    Creatine Kinase 141     TROPONIN I - Normal    Troponin-I <0.010     CBC W/ AUTO DIFFERENTIAL    Narrative:     The following orders were created for panel order CBC Auto Differential.  Procedure                               Abnormality         Status                      ---------                               -----------         ------                     CBC with Differential[[id number]]       Abnormal            Final result                 Please view results for these tests on the individual orders.   COMPREHENSIVE METABOLIC PANEL    Sodium 136      Potassium 3.6      Chloride 101      CO2 27      Glucose 104      Blood Urea Nitrogen 9.1      Creatinine 1.00      Calcium 9.0      Protein Total 6.8      Albumin 3.5      Globulin 3.3      Albumin/Globulin Ratio 1.1      Bilirubin Total 0.6      ALP 60      ALT 7      AST 15      eGFR >60      Anion Gap 8.0      BUN/Creatinine Ratio 9     EXTRA TUBES    Narrative:     The following orders were created for panel order EXTRA TUBES.  Procedure                               Abnormality         Status                     ---------                               -----------         ------                     Light Blue Top Hold[[id number]]                             Final result               Gold Top Hold[[id number]]                                   Final result                 Please view results for these tests on the individual orders.   LIGHT BLUE TOP HOLD    Extra Tube Hold for add-ons.     GOLD TOP HOLD    Extra Tube Hold for add-ons.       EKG Readings: (Independently Interpreted)   My Independent EKG Interpretation  02/13/2025 11:06 PM  Rate: 64 bpm  Rhythm: Atrial paced  Axis: Leftward  Intervals: Normal  ST Changes: None  Impression: Atrial paced rhythm with left axis deviation.           Imaging Results    None          Medications   dicyclomine injection 20 mg (20 mg Intramuscular Given 2/14/25 0011)   ondansetron injection 4 mg (4 mg Intravenous Given 2/14/25 0011)   famotidine (PF) injection 20 mg (20 mg Intravenous Given 2/14/25 0011)   LIDOcaine viscous HCl 2% oral solution 10 mL (10 mLs Oral Given 2/14/25 0125)   aluminum-magnesium hydroxide 200-200 mg/5 mL suspension 30 mL (30 mLs Oral Given 2/14/25 0125)      Medical Decision Making  69-year-old gentleman history of hypertension, aortic regurgitation, sick sinus syndrome status post pacemaker placement presents emergency room complaints of chest pain.  Will obtain cardiac workup and provide symptomatic treatment and continue to monitor.      Differential diagnosis:  Gastritis, gastroesophageal reflux, pancreatitis, nonspecific chest pain, NSTEMI    Amount and/or Complexity of Data Reviewed  Labs: ordered.    Risk  OTC drugs.  Prescription drug management.               ED Course as of 02/14/25 0134 Fri Feb 14, 2025 0132 Feeling improved following GI cocktail.  Stable for discharge to home.  Er precautions for any acute worsening. [MW]      ED Course User Index  [MW] Farrukh Juarez MD                           Clinical Impression:  Final diagnoses:  [R07.89] Atypical chest pain (Primary)  [K29.70] Gastritis, presence of bleeding unspecified, unspecified chronicity, unspecified gastritis type          ED Disposition Condition    Discharge Stable          ED Prescriptions       Medication Sig Dispense Start Date End Date Auth. Provider    ondansetron (ZOFRAN-ODT) 4 MG TbDL Take 1 tablet (4 mg total) by mouth every 6 (six) hours as needed (nausea vomiting). 10 tablet 2/14/2025 2/19/2025 Farrukh Juarez MD    dicyclomine (BENTYL) 10 MG capsule Take 1 capsule (10 mg total) by mouth every 6 (six) hours as needed (abdominal cramping). 20 capsule 2/14/2025 2/24/2025 Farrukh Juarez MD    famotidine (PEPCID) 20 MG tablet Take 1 tablet (20 mg total) by mouth 2 (two) times daily. for 10 days 20 tablet 2/14/2025 2/24/2025 Farrukh Juarez MD          Follow-up Information       Follow up With Specialties Details Why Contact Info    Harmeet Rosenbaum MD Family Medicine   [address] [facility] [id number] [phone]      [facility] Emergency Medicine  As needed, If symptoms worsen [address] [facility]  [place] [address]  [phone]             Farrukh Juarez MD  02/14/25 0134

## 2025-02-16 ENCOUNTER — HOSPITAL ENCOUNTER (EMERGENCY)
Facility: HOSPITAL | Age: 70
Discharge: HOME | End: 2025-02-16
Attending: INTERNAL MEDICINE
Payer: COMMERCIAL

## 2025-02-16 VITALS
WEIGHT: 159.81 LBS | HEART RATE: 60 BPM | SYSTOLIC BLOOD PRESSURE: 135 MMHG | BODY MASS INDEX: 22.88 KG/M2 | RESPIRATION RATE: 14 BRPM | HEIGHT: 70 IN | OXYGEN SATURATION: 98 % | TEMPERATURE: 98 F | DIASTOLIC BLOOD PRESSURE: 90 MMHG

## 2025-02-16 DIAGNOSIS — R00.2 PALPITATIONS: ICD-10-CM

## 2025-02-16 DIAGNOSIS — K21.9 GASTROESOPHAGEAL REFLUX DISEASE WITHOUT ESOPHAGITIS: Primary | ICD-10-CM

## 2025-02-16 DIAGNOSIS — R07.9 CHEST PAIN: ICD-10-CM

## 2025-02-16 LAB
ALBUMIN SERPL-MCNC: 3.5 G/DL (ref 3.4–4.8)
ALBUMIN/GLOB SERPL: 1 RATIO (ref 1.1–2)
ALP SERPL-CCNC: 59 UNIT/L (ref 40–150)
ALT SERPL-CCNC: 7 UNIT/L (ref 0–55)
ANION GAP SERPL CALC-SCNC: 9 MEQ/L
AST SERPL-CCNC: 14 UNIT/L (ref 5–34)
BASOPHILS # BLD AUTO: 0.04 X10(3)/MCL
BASOPHILS NFR BLD AUTO: 0.8 %
BILIRUB SERPL-MCNC: 0.6 MG/DL
BNP BLD-MCNC: 15.5 PG/ML
BUN SERPL-MCNC: 12.2 MG/DL (ref 8.4–25.7)
CALCIUM SERPL-MCNC: 9 MG/DL (ref 8.8–10)
CHLORIDE SERPL-SCNC: 101 MMOL/L (ref 98–107)
CO2 SERPL-SCNC: 25 MMOL/L (ref 23–31)
CREAT SERPL-MCNC: 1.1 MG/DL (ref 0.72–1.25)
CREAT/UREA NIT SERPL: 11
EOSINOPHIL # BLD AUTO: 0.03 X10(3)/MCL (ref 0–0.9)
EOSINOPHIL NFR BLD AUTO: 0.6 %
ERYTHROCYTE [DISTWIDTH] IN BLOOD BY AUTOMATED COUNT: 13.6 % (ref 11.5–17)
GFR SERPLBLD CREATININE-BSD FMLA CKD-EPI: >60 ML/MIN/1.73/M2
GLOBULIN SER-MCNC: 3.6 GM/DL (ref 2.4–3.5)
GLUCOSE SERPL-MCNC: 120 MG/DL (ref 82–115)
HCT VFR BLD AUTO: 38.6 % (ref 42–52)
HGB BLD-MCNC: 12.6 G/DL (ref 14–18)
IMM GRANULOCYTES # BLD AUTO: 0.01 X10(3)/MCL (ref 0–0.04)
IMM GRANULOCYTES NFR BLD AUTO: 0.2 %
LIPASE SERPL-CCNC: 15 U/L
LYMPHOCYTES # BLD AUTO: 1.56 X10(3)/MCL (ref 0.6–4.6)
LYMPHOCYTES NFR BLD AUTO: 31.1 %
MCH RBC QN AUTO: 27.2 PG (ref 27–31)
MCHC RBC AUTO-ENTMCNC: 32.6 G/DL (ref 33–36)
MCV RBC AUTO: 83.4 FL (ref 80–94)
MONOCYTES # BLD AUTO: 0.61 X10(3)/MCL (ref 0.1–1.3)
MONOCYTES NFR BLD AUTO: 12.2 %
NEUTROPHILS # BLD AUTO: 2.77 X10(3)/MCL (ref 2.1–9.2)
NEUTROPHILS NFR BLD AUTO: 55.1 %
NRBC BLD AUTO-RTO: 0 %
PLATELET # BLD AUTO: 172 X10(3)/MCL (ref 130–400)
PMV BLD AUTO: 9.2 FL (ref 7.4–10.4)
POTASSIUM SERPL-SCNC: 3.6 MMOL/L (ref 3.5–5.1)
PROT SERPL-MCNC: 7.1 GM/DL (ref 5.8–7.6)
RBC # BLD AUTO: 4.63 X10(6)/MCL (ref 4.7–6.1)
SODIUM SERPL-SCNC: 135 MMOL/L (ref 136–145)
TROPONIN I SERPL-MCNC: <0.01 NG/ML (ref 0–0.04)
WBC # BLD AUTO: 5.02 X10(3)/MCL (ref 4.5–11.5)

## 2025-02-16 PROCEDURE — 99285 EMERGENCY DEPT VISIT HI MDM: CPT | Mod: 25

## 2025-02-16 PROCEDURE — 83880 ASSAY OF NATRIURETIC PEPTIDE: CPT | Performed by: INTERNAL MEDICINE

## 2025-02-16 PROCEDURE — 83690 ASSAY OF LIPASE: CPT | Performed by: INTERNAL MEDICINE

## 2025-02-16 PROCEDURE — 85025 COMPLETE CBC W/AUTO DIFF WBC: CPT | Performed by: INTERNAL MEDICINE

## 2025-02-16 PROCEDURE — 84484 ASSAY OF TROPONIN QUANT: CPT | Performed by: INTERNAL MEDICINE

## 2025-02-16 PROCEDURE — 80053 COMPREHEN METABOLIC PANEL: CPT | Performed by: INTERNAL MEDICINE

## 2025-02-16 PROCEDURE — 25000003 PHARM REV CODE 250: Performed by: INTERNAL MEDICINE

## 2025-02-16 PROCEDURE — 93005 ELECTROCARDIOGRAM TRACING: CPT

## 2025-02-16 RX ORDER — PANTOPRAZOLE SODIUM 40 MG/1
40 TABLET, DELAYED RELEASE ORAL DAILY
Qty: 30 TABLET | Refills: 0 | Status: SHIPPED | OUTPATIENT
Start: 2025-02-16 | End: 2025-03-18

## 2025-02-16 RX ORDER — ALUMINUM HYDROXIDE, MAGNESIUM HYDROXIDE, AND SIMETHICONE 1200; 120; 1200 MG/30ML; MG/30ML; MG/30ML
30 SUSPENSION ORAL ONCE
Status: COMPLETED | OUTPATIENT
Start: 2025-02-16 | End: 2025-02-16

## 2025-02-16 RX ADMIN — ALUMINUM HYDROXIDE, MAGNESIUM HYDROXIDE, AND SIMETHICONE 30 ML: 200; 200; 20 SUSPENSION ORAL at 11:02

## 2025-02-17 LAB
OHS QRS DURATION: 80 MS
OHS QTC CALCULATION: 418 MS

## 2025-02-17 NOTE — ED PROVIDER NOTES
"Encounter Date: 2/16/2025  History from patient     History     Chief Complaint   Patient presents with    Abdominal Pain    chest discomfort    Palpitations     States has not eaten in 2 days.  Presents for epigastric pain and burning, chest discomfort and feels like "my heart is beating fast".       LISA Reyna is 69 y.o. male who  has a past medical history of Atrial fibrillation, Hyperlipidemia, and Hypertension. arrives in ER with c/o Abdominal Pain, chest discomfort, and Palpitations (States has not eaten in 2 days.  Presents for epigastric pain and burning, chest discomfort and feels like "my heart is beating fast".  )      Review of patient's allergies indicates:   Allergen Reactions    Aspirin Other (See Comments)     Stomach burning    Atorvastatin      Other reaction(s): Stomach cramps    Nsaids (non-steroidal anti-inflammatory drug) Nausea Only    Penicillins Rash     Other reaction(s): rash     Past Medical History:   Diagnosis Date    Atrial fibrillation     Hyperlipidemia     Hypertension      Past Surgical History:   Procedure Laterality Date    INSERT / REPLACE / REMOVE PACEMAKER       Family History   Problem Relation Name Age of Onset    Heart attack Mother      Hypertension Father       Social History[1]  Review of Systems   Constitutional:  Negative for fever.   HENT:  Negative for trouble swallowing and voice change.    Eyes:  Negative for visual disturbance.   Respiratory:  Negative for cough and shortness of breath.    Cardiovascular:  Positive for palpitations. Negative for chest pain.   Gastrointestinal:  Positive for abdominal pain. Negative for diarrhea and vomiting.   Genitourinary:  Negative for dysuria and hematuria.   Musculoskeletal:  Negative for back pain and gait problem.   Skin:  Negative for color change and rash.   Neurological:  Negative for headaches.   Psychiatric/Behavioral:  Negative for behavioral problems and sleep disturbance.    All other systems reviewed " and are negative.      Physical Exam     Initial Vitals [02/16/25 2143]   BP Pulse Resp Temp SpO2   (!) 150/98 72 17 98 °F (36.7 °C) 100 %      MAP       --         Physical Exam    Nursing note and vitals reviewed.  Constitutional: He appears well-developed. No distress.   HENT:   Head: Atraumatic.   Eyes: EOM are normal.   Neck: Neck supple.   Cardiovascular:  Normal rate, regular rhythm and normal heart sounds.           Pulmonary/Chest: Breath sounds normal. No respiratory distress. He has no wheezes.   Abdominal: Abdomen is soft. Bowel sounds are normal. He exhibits no distension. There is no abdominal tenderness. There is no rebound.   Musculoskeletal:         General: No edema. Normal range of motion.      Cervical back: Neck supple. No bony tenderness.     Neurological: He is alert.   Speech Normal   Skin: Skin is dry.   Psychiatric: He has a normal mood and affect.   Pleasant         ED Course   Procedures  Orders Placed This Encounter    X-ray Chest AP Portable    Comprehensive metabolic panel    CBC auto differential    Troponin I    Brain natriuretic peptide    CBC with Differential    Lipase    Diet NPO    Vital signs    Cardiac Monitoring - Adult    Oxygen Continuous    Pulse Oximetry Continuous    EKG 12-lead    Insert saline lock    pantoprazole (PROTONIX) 40 MG tablet    aluminum-magnesium hydroxide-simethicone 200-200-20 mg/5 mL suspension 30 mL       Labs Reviewed   COMPREHENSIVE METABOLIC PANEL - Abnormal       Result Value    Sodium 135 (*)     Potassium 3.6      Chloride 101      CO2 25      Glucose 120 (*)     Blood Urea Nitrogen 12.2      Creatinine 1.10      Calcium 9.0      Protein Total 7.1      Albumin 3.5      Globulin 3.6 (*)     Albumin/Globulin Ratio 1.0 (*)     Bilirubin Total 0.6      ALP 59      ALT 7      AST 14      eGFR >60      Anion Gap 9.0      BUN/Creatinine Ratio 11     CBC WITH DIFFERENTIAL - Abnormal    WBC 5.02      RBC 4.63 (*)     Hgb 12.6 (*)     Hct 38.6 (*)     MCV  83.4      MCH 27.2      MCHC 32.6 (*)     RDW 13.6      Platelet 172      MPV 9.2      Neut % 55.1      Lymph % 31.1      Mono % 12.2      Eos % 0.6      Basophil % 0.8      Imm Grans % 0.2      Neut # 2.77      Lymph # 1.56      Mono # 0.61      Eos # 0.03      Baso # 0.04      Imm Gran # 0.01      NRBC% 0.0     TROPONIN I - Normal    Troponin-I <0.010     B-TYPE NATRIURETIC PEPTIDE - Normal    Natriuretic Peptide 15.5     LIPASE - Normal    Lipase Level 15     CBC W/ AUTO DIFFERENTIAL    Narrative:     The following orders were created for panel order CBC auto differential.  Procedure                               Abnormality         Status                     ---------                               -----------         ------                     CBC with Differential[[id number]]       Abnormal            Final result                 Please view results for these tests on the individual orders.        ECG Results              EKG 12-lead (Preliminary result)  Result time 02/16/25 23:10:35      Wet Read by Frieda Drew MD (02/16/25 23:10:35, [facility], Emergency Medicine)    EKG: Independently reviewed and / or Interpreted by Frieda Drew MD. independently as Rate 68, atrial paced rhythm with PACs, Normal Axis, Normal Intervals., No STEMI                                   Imaging Results              X-ray Chest AP Portable (Final result)  Result time 02/16/25 22:48:58      Final result by Filiberto Infante MD (02/16/25 22:48:58)                   Impression:      No acute cardiopulmonary process identified..      Electronically signed by: Filiberto Infante  Date:    02/16/2025  Time:    22:48               Narrative:    EXAMINATION:  XR CHEST AP PORTABLE    CLINICAL HISTORY:  Chest discomfort.  Epigastric pain and burning    TECHNIQUE:  One view    COMPARISON:  November 7, 2023.    FINDINGS:  Cardiopericardial silhouette is within normal limits.  Left chest implanted cardiac device  "electrodes are in similar position.  No acute dense focal or segmental consolidation, congestive process, pleural effusions or pneumothorax.                                       Medications   aluminum-magnesium hydroxide-simethicone 200-200-20 mg/5 mL suspension 30 mL (30 mLs Oral Given 2/16/25 2308)     Medical Decision Making    Rad Reyna is 69 y.o. male who  has a past medical history of Atrial fibrillation, Hyperlipidemia, and Hypertension. arrives in ER with c/o Abdominal Pain, chest discomfort, and Palpitations (States has not eaten in 2 days.  Presents for epigastric pain and burning, chest discomfort and feels like "my heart is beating fast".  )    Patient comes to the emergency room with complaint of palpitations, and epigastric pain and burning in chest pain going on for a couple of days, he says he came to the emergency room 2 days back and they put him on acid reflux medicines and sent him home but it is not working.  Patient says that he used to be on acid reflux medicines for almost 6 years and then they stopped it and then he is taking over-the-counter Pepcid and when he came to the ER they put him on the medicines again but it is not working so he came back to the ER.  He says he had gone to the dentist and he had put him on some oral mouthwash and the tooth is feeling better though.  Denies any other complaints.        Amount and/or Complexity of Data Reviewed  Labs: ordered.  Radiology: ordered.    Risk  OTC drugs.  Prescription drug management.               ED Course as of 02/16/25 2311   Sun Feb 16, 2025 2301 Patient's workup is essentially negative for any coronary syndrome, EKG does not show any acute abnormality, although he says that his heart is beating fast but his heart is beating in 60s, troponin is 0 BNP is normal chest x-ray is normal mainly he has acid reflux he says he was on medicines for 6 year in the stopped it and now he is having epigastric pain which goes to the left " side [GQ]   2302 I did the whole workup on her which does not show any acute abnormality,    No signs of acute pancreatitis or cholecystitis or any liver disease.  I will let him go home with instruction to urologist for re-evaluation, and I will prescribe him Protonix. [GQ]      ED Course User Index  [GQ] Frieda Drew MD                           Clinical Impression:  Final diagnoses:  [R00.2] Palpitations  [R07.9] Chest pain  [K21.9] Gastroesophageal reflux disease without esophagitis (Primary)          ED Disposition Condition    Discharge Stable          ED Prescriptions       Medication Sig Dispense Start Date End Date Auth. Provider    pantoprazole (PROTONIX) 40 MG tablet Take 1 tablet (40 mg total) by mouth once daily. 30 tablet 2/16/2025 3/18/2025 Frieda Drew MD          Follow-up Information       Follow up With Specialties Details Why Contact Info    Harmeet Rosenbaum MD Family Medicine In 2 days  [address] [facility] [id number] [phone]      Cardiologist                   Frieda Drew MD  02/16/25 3610         [1]   Social History  Tobacco Use    Smoking status: Never    Smokeless tobacco: Never   Substance Use Topics    Alcohol use: Not Currently    Drug use: Not Currently        Frieda Drew MD  02/16/25 4389

## 2025-02-17 NOTE — ED NOTES
Did not fill prescriptions given yesterday as he understood it was only something for nausea. Explained 3 prescriptions were sent to pharmacy of choice. States has pepcid at home but did not take today.

## 2025-02-17 NOTE — DISCHARGE INSTRUCTIONS
See a cardiologist/Heart Doctor to do a stress test and evaluate further as soon as possible,    Avoid any strenuous activity till you see the cardiologist.    If you do not have a cardiologist talk to your family doctor to refer you to one today.        Take medicines as prescribed    See your family doctor in one to 2 days for further evaluation, workup, and treatment as necessary    Avoid driving or operating machinery while taking medicines as some medicines might cause drowsiness and may cause problems. Also pain medicines have potential of being addictive  so use Pain meds specially Narcotics Sparingly.    The exam and treatment you received in Emergency Room was for an urgent problem and NOT INTENDED AS COMPLETE CARE. It is important that you FOLLOW UP with a doctor for ongoing care. If your symptoms become WORSE or you DO NOT IMPROVE and you are unable to reach your health care provider, you should RETURN to the emergency department. The Emergency Room doctor has provided a PRELIMINARY INTERPRETATION of all your STUDIES. A final interpretation may be done after you are discharged. IF A CHANGE in your diagnosis or treatment is needed WE WILL CONTACT YOU. It is critical that we have a CURRENT PHONE NUMBER FOR YOU.

## 2025-03-11 ENCOUNTER — CLINICAL SUPPORT (OUTPATIENT)
Dept: CARDIOLOGY | Facility: CLINIC | Age: 70
End: 2025-03-11
Payer: COMMERCIAL

## 2025-03-11 DIAGNOSIS — I49.5 SICK SINUS SYNDROME: ICD-10-CM

## 2025-03-11 DIAGNOSIS — Z95.0 NORMALLY FUNCTIONING CARDIAC PACEMAKER PRESENT: Primary | ICD-10-CM

## 2025-03-11 PROCEDURE — 99211 OFF/OP EST MAY X REQ PHY/QHP: CPT | Mod: PBBFAC

## 2025-03-11 PROCEDURE — 93280 PM DEVICE PROGR EVAL DUAL: CPT | Mod: PBBFAC | Performed by: INTERNAL MEDICINE

## 2025-03-21 ENCOUNTER — HOSPITAL ENCOUNTER (OUTPATIENT)
Dept: CARDIOLOGY | Facility: HOSPITAL | Age: 70
Discharge: HOME | End: 2025-03-21
Attending: INTERNAL MEDICINE
Payer: COMMERCIAL

## 2025-03-21 VITALS
DIASTOLIC BLOOD PRESSURE: 88 MMHG | BODY MASS INDEX: 22.76 KG/M2 | WEIGHT: 159 LBS | SYSTOLIC BLOOD PRESSURE: 138 MMHG | HEIGHT: 70 IN

## 2025-03-21 DIAGNOSIS — I34.0 MODERATE MITRAL REGURGITATION: ICD-10-CM

## 2025-03-21 LAB
APICAL FOUR CHAMBER EJECTION FRACTION: 59 %
AV INDEX (PROSTH): 0.65
AV MEAN GRADIENT: 6 MMHG
AV PEAK GRADIENT: 10 MMHG
AV REGURGITATION PRESSURE HALF TIME: 650 MS
AV VALVE AREA BY VELOCITY RATIO: 3.1 CM²
AV VALVE AREA: 2.5 CM²
AV VELOCITY RATIO: 0.81
BSA FOR ECHO PROCEDURE: 1.89 M2
CV ECHO LV RWT: 0.29 CM
DOP CALC AO PEAK VEL: 1.6 M/S
DOP CALC AO VTI: 32.5 CM
DOP CALC LVOT AREA: 3.8 CM2
DOP CALC LVOT DIAMETER: 2.2 CM
DOP CALC LVOT PEAK VEL: 1.3 M/S
DOP CALC LVOT STROKE VOLUME: 79.8 CM3
DOP CALC MV VTI: 34.8 CM
DOP CALCLVOT PEAK VEL VTI: 21 CM
E WAVE DECELERATION TIME: 354 MSEC
E/A RATIO: 1.37
E/E' RATIO: 9 M/S
ECHO LV POSTERIOR WALL: 0.9 CM (ref 0.6–1.1)
FRACTIONAL SHORTENING: 35.5 % (ref 28–44)
INTERVENTRICULAR SEPTUM: 1 CM (ref 0.6–1.1)
LEFT ATRIUM SIZE: 4.5 CM
LEFT INTERNAL DIMENSION IN SYSTOLE: 4 CM (ref 2.1–4)
LEFT VENTRICLE DIASTOLIC VOLUME INDEX: 101.59 ML/M2
LEFT VENTRICLE DIASTOLIC VOLUME: 192 ML
LEFT VENTRICLE END DIASTOLIC VOLUME APICAL 4 CHAMBER: 167.59 ML
LEFT VENTRICLE MASS INDEX: 129.3 G/M2
LEFT VENTRICLE SYSTOLIC VOLUME INDEX: 37 ML/M2
LEFT VENTRICLE SYSTOLIC VOLUME: 70 ML
LEFT VENTRICULAR INTERNAL DIMENSION IN DIASTOLE: 6.2 CM (ref 3.5–6)
LEFT VENTRICULAR MASS: 244.5 G
LV LATERAL E/E' RATIO: 7.4 M/S
LV SEPTAL E/E' RATIO: 12.7 M/S
LVED V (TEICH): 191.73 ML
LVES V (TEICH): 70.41 ML
LVOT MG: 2.32 MMHG
LVOT MV: 0.66 CM/S
MV MEAN GRADIENT: 2 MMHG
MV PEAK A VEL: 0.65 M/S
MV PEAK E VEL: 0.89 M/S
MV PEAK GRADIENT: 7 MMHG
MV STENOSIS PRESSURE HALF TIME: 102.61 MS
MV VALVE AREA BY CONTINUITY EQUATION: 2.29 CM2
MV VALVE AREA P 1/2 METHOD: 2.14 CM2
OHS CV RV/LV RATIO: 0.47 CM
OHS LV EJECTION FRACTION SIMPSONS BIPLANE MOD: 59 %
PISA AR MAX VEL: 4.34 M/S
PISA TR MAX VEL: 2.4 M/S
RA PRESSURE ESTIMATED: 8 MMHG
RIGHT VENTRICLE DIASTOLIC BASEL DIMENSION: 2.9 CM
RIGHT VENTRICULAR END-DIASTOLIC DIMENSION: 2.89 CM
RV TB RVSP: 10 MMHG
TDI LATERAL: 0.12 M/S
TDI SEPTAL: 0.07 M/S
TDI: 0.1 M/S
TR MAX PG: 24 MMHG
TRICUSPID ANNULAR PLANE SYSTOLIC EXCURSION: 1.92 CM
TV REST PULMONARY ARTERY PRESSURE: 31 MMHG
Z-SCORE OF LEFT VENTRICULAR DIMENSION IN END DIASTOLE: 1.59
Z-SCORE OF LEFT VENTRICULAR DIMENSION IN END SYSTOLE: 1.61

## 2025-03-21 PROCEDURE — 93306 TTE W/DOPPLER COMPLETE: CPT

## 2025-04-28 ENCOUNTER — HOSPITAL ENCOUNTER (EMERGENCY)
Facility: HOSPITAL | Age: 70
Discharge: HOME | End: 2025-04-28
Attending: EMERGENCY MEDICINE
Payer: COMMERCIAL

## 2025-04-28 VITALS
HEIGHT: 70 IN | WEIGHT: 167 LBS | BODY MASS INDEX: 23.91 KG/M2 | RESPIRATION RATE: 38 BRPM | HEART RATE: 70 BPM | TEMPERATURE: 98 F | DIASTOLIC BLOOD PRESSURE: 75 MMHG | SYSTOLIC BLOOD PRESSURE: 107 MMHG | OXYGEN SATURATION: 76 %

## 2025-04-28 DIAGNOSIS — E86.0 MILD DEHYDRATION: ICD-10-CM

## 2025-04-28 DIAGNOSIS — R55 NEAR SYNCOPE: Primary | ICD-10-CM

## 2025-04-28 LAB
ALBUMIN SERPL-MCNC: 3.8 G/DL (ref 3.4–4.8)
ALBUMIN/GLOB SERPL: 1.1 RATIO (ref 1.1–2)
ALP SERPL-CCNC: 64 UNIT/L (ref 40–150)
ALT SERPL-CCNC: 18 UNIT/L (ref 0–55)
ANION GAP SERPL CALC-SCNC: 7 MEQ/L
AST SERPL-CCNC: 22 UNIT/L (ref 11–45)
BACTERIA #/AREA URNS AUTO: ABNORMAL /HPF
BASOPHILS # BLD AUTO: 0.07 X10(3)/MCL
BASOPHILS NFR BLD AUTO: 1.3 %
BILIRUB SERPL-MCNC: 0.5 MG/DL
BILIRUB UR QL STRIP.AUTO: NEGATIVE
BNP BLD-MCNC: 41.6 PG/ML
BUN SERPL-MCNC: 16.4 MG/DL (ref 8.4–25.7)
CALCIUM SERPL-MCNC: 9 MG/DL (ref 8.8–10)
CHLORIDE SERPL-SCNC: 109 MMOL/L (ref 98–107)
CLARITY UR: CLEAR
CO2 SERPL-SCNC: 26 MMOL/L (ref 23–31)
COLOR UR AUTO: ABNORMAL
CREAT SERPL-MCNC: 1.32 MG/DL (ref 0.72–1.25)
CREAT/UREA NIT SERPL: 12
EOSINOPHIL # BLD AUTO: 0.14 X10(3)/MCL (ref 0–0.9)
EOSINOPHIL NFR BLD AUTO: 2.6 %
ERYTHROCYTE [DISTWIDTH] IN BLOOD BY AUTOMATED COUNT: 15.3 % (ref 11.5–17)
GFR SERPLBLD CREATININE-BSD FMLA CKD-EPI: 59 ML/MIN/1.73/M2
GLOBULIN SER-MCNC: 3.5 GM/DL (ref 2.4–3.5)
GLUCOSE SERPL-MCNC: 89 MG/DL (ref 82–115)
GLUCOSE UR QL STRIP: NORMAL
HCT VFR BLD AUTO: 39.3 % (ref 42–52)
HGB BLD-MCNC: 12.5 G/DL (ref 14–18)
HGB UR QL STRIP: NEGATIVE
HOLD SPECIMEN: NORMAL
HOLD SPECIMEN: NORMAL
HYALINE CASTS #/AREA URNS LPF: ABNORMAL /LPF
IMM GRANULOCYTES # BLD AUTO: 0.01 X10(3)/MCL (ref 0–0.04)
IMM GRANULOCYTES NFR BLD AUTO: 0.2 %
KETONES UR QL STRIP: NEGATIVE
LEUKOCYTE ESTERASE UR QL STRIP: NEGATIVE
LYMPHOCYTES # BLD AUTO: 1.24 X10(3)/MCL (ref 0.6–4.6)
LYMPHOCYTES NFR BLD AUTO: 22.6 %
MCH RBC QN AUTO: 26.8 PG (ref 27–31)
MCHC RBC AUTO-ENTMCNC: 31.8 G/DL (ref 33–36)
MCV RBC AUTO: 84.3 FL (ref 80–94)
MONOCYTES # BLD AUTO: 0.57 X10(3)/MCL (ref 0.1–1.3)
MONOCYTES NFR BLD AUTO: 10.4 %
MUCOUS THREADS URNS QL MICRO: ABNORMAL /LPF
NEUTROPHILS # BLD AUTO: 3.45 X10(3)/MCL (ref 2.1–9.2)
NEUTROPHILS NFR BLD AUTO: 62.9 %
NITRITE UR QL STRIP: NEGATIVE
NRBC BLD AUTO-RTO: 0 %
OHS QRS DURATION: 90 MS
OHS QTC CALCULATION: 440 MS
PH UR STRIP: 6 [PH]
PLATELET # BLD AUTO: 180 X10(3)/MCL (ref 130–400)
PMV BLD AUTO: 9.3 FL (ref 7.4–10.4)
POTASSIUM SERPL-SCNC: 4 MMOL/L (ref 3.5–5.1)
PROT SERPL-MCNC: 7.3 GM/DL (ref 5.8–7.6)
PROT UR QL STRIP: NEGATIVE
RBC # BLD AUTO: 4.66 X10(6)/MCL (ref 4.7–6.1)
RBC #/AREA URNS AUTO: ABNORMAL /HPF
SODIUM SERPL-SCNC: 142 MMOL/L (ref 136–145)
SP GR UR STRIP.AUTO: 1.02 (ref 1–1.03)
SQUAMOUS #/AREA URNS LPF: ABNORMAL /HPF
TROPONIN I SERPL-MCNC: <0.01 NG/ML (ref 0–0.04)
UROBILINOGEN UR STRIP-ACNC: NORMAL
WBC # BLD AUTO: 5.48 X10(3)/MCL (ref 4.5–11.5)
WBC #/AREA URNS AUTO: ABNORMAL /HPF

## 2025-04-28 PROCEDURE — 83880 ASSAY OF NATRIURETIC PEPTIDE: CPT | Performed by: NURSE PRACTITIONER

## 2025-04-28 PROCEDURE — 81001 URINALYSIS AUTO W/SCOPE: CPT | Performed by: NURSE PRACTITIONER

## 2025-04-28 PROCEDURE — 85025 COMPLETE CBC W/AUTO DIFF WBC: CPT | Performed by: NURSE PRACTITIONER

## 2025-04-28 PROCEDURE — 99285 EMERGENCY DEPT VISIT HI MDM: CPT | Mod: 25

## 2025-04-28 PROCEDURE — 80053 COMPREHEN METABOLIC PANEL: CPT | Performed by: NURSE PRACTITIONER

## 2025-04-28 PROCEDURE — 84484 ASSAY OF TROPONIN QUANT: CPT | Performed by: NURSE PRACTITIONER

## 2025-04-28 PROCEDURE — 93005 ELECTROCARDIOGRAM TRACING: CPT

## 2025-04-28 NOTE — ED PROVIDER NOTES
Encounter Date: 4/28/2025       History     Chief Complaint   Patient presents with    Fatigue     Pt arrives via ems after working in yard states he felt faint and almost fell down.  [facility] has a pacemaker. Cbg with ems 93     The patient presents with near syncopal episode. The onset was just prior to arrival.  The course/duration of symptoms is resolved: lasted for few minutes. The location where the incident occurred was at home.  The exacerbating factor is was working outside in his yard, then went from sitting to standing and felt weak. After sitting down and drinking fluids he reports that he is back to normal. Prior episodes: none. Therapy today: po fluids. Preceding symptoms: none. Associated symptoms: denies headache, denies chest pain, denies fever, denies chills, denies cough, denies nausea, denies vomiting and denies abdominal pain. Associated injuries: none.           Review of patient's allergies indicates:   Allergen Reactions    Aspirin Other (See Comments)     Stomach burning    Atorvastatin      Other reaction(s): Stomach cramps    Nsaids (non-steroidal anti-inflammatory drug) Nausea Only    Penicillins Rash     Other reaction(s): rash     Past Medical History:   Diagnosis Date    Atrial fibrillation     Hyperlipidemia     Hypertension      Past Surgical History:   Procedure Laterality Date    INSERT / REPLACE / REMOVE PACEMAKER       Family History   Problem Relation Name Age of Onset    Heart attack Mother      Hypertension Father       Social History[1]  Review of Systems   Constitutional:  Negative for fever.   HENT:  Negative for sore throat.    Respiratory:  Negative for shortness of breath.    Cardiovascular:  Negative for chest pain.   Gastrointestinal:  Negative for nausea.   Genitourinary:  Negative for dysuria.   Musculoskeletal:  Negative for back pain.   Skin:  Negative for rash.   Neurological:  Positive for weakness.   Hematological:  Does not bruise/bleed easily.   All other  systems reviewed and are negative.      Physical Exam     Initial Vitals [04/28/25 1410]   BP Pulse Resp Temp SpO2   123/89 64 18 98.4 °F (36.9 °C) 100 %      MAP       --         Physical Exam    Nursing note and vitals reviewed.  Constitutional: He appears well-developed and well-nourished.   HENT:   Head: Normocephalic and atraumatic.   Right Ear: Tympanic membrane normal.   Left Ear: Tympanic membrane normal.   Nose: Nose normal. Mouth/Throat: Uvula is midline, oropharynx is clear and moist and mucous membranes are normal.   Neck: Neck supple.   Normal range of motion.  Cardiovascular:  Normal rate, regular rhythm, normal heart sounds and intact distal pulses.           Pulmonary/Chest: Effort normal and breath sounds normal. He has no decreased breath sounds.   Abdominal: Abdomen is soft and flat. Bowel sounds are normal. There is no abdominal tenderness.   Musculoskeletal:         General: Normal range of motion.      Cervical back: Normal range of motion and neck supple.     Neurological: He is alert and oriented to person, place, and time. He has normal strength. No cranial nerve deficit.   Skin: Skin is warm and dry.   Psychiatric: He has a normal mood and affect.         ED Course   Procedures  Labs Reviewed   COMPREHENSIVE METABOLIC PANEL - Abnormal       Result Value    Sodium 142      Potassium 4.0      Chloride 109 (*)     CO2 26      Glucose 89      Blood Urea Nitrogen 16.4      Creatinine 1.32 (*)     Calcium 9.0      Protein Total 7.3      Albumin 3.8      Globulin 3.5      Albumin/Globulin Ratio 1.1      Bilirubin Total 0.5      ALP 64      ALT 18      AST 22      eGFR 59      Anion Gap 7.0      BUN/Creatinine Ratio 12     URINALYSIS, REFLEX TO URINE CULTURE - Abnormal    Color, UA Light-Yellow      Appearance, UA Clear      Specific Gravity, UA 1.023      pH, UA 6.0      Protein, UA Negative      Glucose, UA Normal      Ketones, UA Negative      Blood, UA Negative      Bilirubin, UA Negative       Urobilinogen, UA Normal      Nitrites, UA Negative      Leukocyte Esterase, UA Negative      RBC, UA 0-5      WBC, UA 0-5      Bacteria, UA Trace (*)     Squamous Epithelial Cells, UA Occasional (*)     Mucous, UA Trace (*)     Hyaline Casts, UA None Seen     CBC WITH DIFFERENTIAL - Abnormal    WBC 5.48      RBC 4.66 (*)     Hgb 12.5 (*)     Hct 39.3 (*)     MCV 84.3      MCH 26.8 (*)     MCHC 31.8 (*)     RDW 15.3      Platelet 180      MPV 9.3      Neut % 62.9      Lymph % 22.6      Mono % 10.4      Eos % 2.6      Basophil % 1.3      Imm Grans % 0.2      Neut # 3.45      Lymph # 1.24      Mono # 0.57      Eos # 0.14      Baso # 0.07      Imm Gran # 0.01      NRBC% 0.0     TROPONIN I - Normal    Troponin-I <0.010     B-TYPE NATRIURETIC PEPTIDE - Normal    Natriuretic Peptide 41.6     CBC W/ AUTO DIFFERENTIAL    Narrative:     The following orders were created for panel order CBC auto differential.  Procedure                               Abnormality         Status                     ---------                               -----------         ------                     CBC with Differential[[id number]]       Abnormal            Final result                 Please view results for these tests on the individual orders.   EXTRA TUBES    Narrative:     The following orders were created for panel order EXTRA TUBES.  Procedure                               Abnormality         Status                     ---------                               -----------         ------                     Light Blue Top Hold[[id number]]                             Final result               Gold Top Hold[[id number]]                                   Final result                 Please view results for these tests on the individual orders.   LIGHT BLUE TOP HOLD    Extra Tube Hold for add-ons.     GOLD TOP HOLD    Extra Tube Hold for add-ons.       EKG Readings: (Independently Interpreted)   Initial Reading: No STEMI. Rhythm: Paced Rhythm.  Ectopy: No Ectopy. Conduction: LAFB. Axis: Normal.   Reviewed by Dr Skinner (ER staff)     ECG Results              EKG 12-lead (Final result)        Collection Time Result Time QRS Duration [facility] QTC Calculation    04/28/25 15:00:11 04/28/25 15:13:05 90 440                     Final result by Interface, Lab In [facility] (04/28/25 15:13:09)                   Narrative:    Test Reason : R55,    Vent. Rate :  86 BPM     Atrial Rate :  86 BPM     P-R Int : 266 ms          QRS Dur :  90 ms      QT Int : 368 ms       P-R-T Axes :  76 -65  78 degrees    QTcB Int : 440 ms    Atrial-paced rhythm with prolonged AV conduction  Left anterior fascicular block  Abnormal ECG    Confirmed by Vladislav Colon ([id number]) on 4/28/2025 3:13:01 PM    Referred By:            Confirmed By: Vladislav Colon                                  Imaging Results              X-Ray Chest AP Portable (Final result)  Result time 04/28/25 15:23:52      Final result by Stoney Viramontes MD (04/28/25 15:23:52)                   Impression:      No acute chest disease is identified.      Electronically signed by: Stoney Viramontes  Date:    04/28/2025  Time:    15:23               Narrative:    EXAMINATION:  XR CHEST AP PORTABLE    CLINICAL HISTORY:  , Syncope and collapse.    FINDINGS:  No alveolar consolidation, effusion, or pneumothorax is seen.   The thoracic aorta is normal  cardiac silhouette, central pulmonary vessels and mediastinum are normal in size and are grossly unremarkable.   visualized osseous structures are grossly unremarkable.                                       Medications - No data to display  Medical Decision Making  The patient presents with near syncopal episode. The onset was just prior to arrival.  The course/duration of symptoms is resolved: lasted for few minutes. The location where the incident occurred was at home.  The exacerbating factor is was working outside in his yard, then went from sitting to standing and felt weak. After  sitting down and drinking fluids he reports that he is back to normal. Prior episodes: none. Therapy today: po fluids. Preceding symptoms: none. Associated symptoms: denies headache, denies chest pain, denies fever, denies chills, denies cough, denies nausea, denies vomiting and denies abdominal pain. Associated injuries: none.       Patient with no complaints since in the ER. Workup unremarkable. Agree with patient that he was probably dehydrated with symptoms resolving with po fluids. Will discharge per patient's request and encourage to drink plenty fluids when working outside. He will f/u with his pcp. Strict return to ER precautions given. 5:05 PM DISPOSITION: The patient is resting comfortably in no acute distress.  He is hemodynamically stable and is without objective evidence for acute process requiring urgent intervention or hospitalization. I provided counseling to patient with regard to condition, the treatment plan, specific conditions for return, and the importance of follow up. Detailed written and verbal instructions provided to patient and he expressed a verbal understanding of the discharge instructions and overall management plan. Reiterated the importance of medication administration and safety and advised patient to follow up with primary care provider in 3-5 days or sooner if needed.  Answered questions at this time. The patient is stable for discharge.       Amount and/or Complexity of Data Reviewed  Labs: ordered.  Radiology: ordered. Decision-making details documented in ED Course.      Additional MDM:   Differential Diagnosis:   At this time differential diagnosis is but not limited to syncopal episode, complex migraine headache, cva, tia, acute coronary syndrome, seizure                ED Course as of 04/28/25 1708   Mon Apr 28, 2025   1532 X-Ray Chest AP Portable  Impression:     No acute chest disease is identified.      [RB]   1704 Given strict ED return precautions. I have spoken with  the patient and/or caregivers. I have explained the patient's condition, diagnoses and treatment plan based on the information available to me at this time. I have answered the patient's and/or caregiver's questions and addressed any concerns. The patient and/or caregivers have as good an understanding of the patient's diagnosis, condition and treatment plan as can be expected at this point. The vital signs have been stable. The patient's condition is stable and appropriate for discharge from the emergency department.      The patient will pursue further outpatient evaluation with the primary care physician or other designated or consulting physician as outlined in the discharge instructions. The patient and/or caregivers are agreeable to this plan of care and follow-up instructions have been explained in detail. The patient and/or caregivers have received these instructions in written format and have expressed an understanding of the discharge instructions. The patient and/or caregivers are aware that any significant change in condition or worsening of symptoms should prompt an immediate return to this or the closest emergency department or a call to 911.      [RB]      ED Course User Index  [RB] Henrik Tamez, YOLY                           Clinical Impression:  Final diagnoses:  [R55] Near syncope (Primary)  [E86.0] Mild dehydration          ED Disposition Condition    Discharge Stable          ED Prescriptions    None       Follow-up Information       Follow up With Specialties Details Why Contact Info    Harmeet Rosenbaum MD Family Medicine In 3 days  [address] [facility] [address] [phone]      [facility] Emergency Medicine  If symptoms worsen [address] [facility] [address] [phone]                 [1]   Social History  Tobacco Use    Smoking status: Never    Smokeless tobacco: Never   Substance Use Topics    Alcohol use: Not Currently    Drug use:  Not Currently        Henrik Tamez, [facility]  04/28/25 170

## 2025-05-01 DIAGNOSIS — D50.9 IRON DEFICIENCY ANEMIA, UNSPECIFIED IRON DEFICIENCY ANEMIA TYPE: Primary | ICD-10-CM

## 2025-05-01 DIAGNOSIS — D72.819 LEUKOPENIA, UNSPECIFIED TYPE: ICD-10-CM

## 2025-06-05 ENCOUNTER — OFFICE VISIT (OUTPATIENT)
Dept: FAMILY MEDICINE | Facility: CLINIC | Age: 70
End: 2025-06-05
Payer: OTHER GOVERNMENT

## 2025-06-05 VITALS
WEIGHT: 161 LBS | HEART RATE: 68 BPM | DIASTOLIC BLOOD PRESSURE: 78 MMHG | RESPIRATION RATE: 20 BRPM | BODY MASS INDEX: 23.05 KG/M2 | OXYGEN SATURATION: 100 % | TEMPERATURE: 98 F | SYSTOLIC BLOOD PRESSURE: 117 MMHG | HEIGHT: 70 IN

## 2025-06-05 DIAGNOSIS — Z00.00 WELLNESS EXAMINATION: Primary | ICD-10-CM

## 2025-06-05 DIAGNOSIS — N52.9 ERECTILE DYSFUNCTION, UNSPECIFIED ERECTILE DYSFUNCTION TYPE: ICD-10-CM

## 2025-06-05 PROBLEM — E78.00 PURE HYPERCHOLESTEROLEMIA, UNSPECIFIED: Status: ACTIVE | Noted: 2025-06-05

## 2025-06-05 PROBLEM — M25.476 SWELLING OF FIRST METATARSOPHALANGEAL (MTP) JOINT: Status: ACTIVE | Noted: 2025-06-05

## 2025-06-05 PROBLEM — E78.5 HYPERLIPIDEMIA, UNSPECIFIED: Status: ACTIVE | Noted: 2025-06-05

## 2025-06-05 PROBLEM — N18.2 STAGE 2 CHRONIC KIDNEY DISEASE: Status: ACTIVE | Noted: 2025-06-05

## 2025-06-05 PROBLEM — M20.12 ACQUIRED HALLUX VALGUS OF LEFT FOOT: Status: ACTIVE | Noted: 2025-06-05

## 2025-06-05 PROBLEM — Z95.0 CARDIAC PACEMAKER IN SITU: Status: ACTIVE | Noted: 2025-06-05

## 2025-06-05 PROBLEM — M25.579 ARTHRALGIA OF FOOT: Status: ACTIVE | Noted: 2025-06-05

## 2025-06-05 PROBLEM — E78.5 HYPERLIPIDEMIA: Status: ACTIVE | Noted: 2025-06-05

## 2025-06-05 PROBLEM — M21.40 PES PLANUS: Status: ACTIVE | Noted: 2025-06-05

## 2025-06-05 PROCEDURE — 99214 OFFICE O/P EST MOD 30 MIN: CPT | Mod: PBBFAC

## 2025-07-15 ENCOUNTER — OFFICE VISIT (OUTPATIENT)
Dept: CARDIOLOGY | Facility: CLINIC | Age: 70
End: 2025-07-15
Payer: OTHER GOVERNMENT

## 2025-07-15 VITALS
HEART RATE: 64 BPM | BODY MASS INDEX: 23.53 KG/M2 | HEIGHT: 70 IN | DIASTOLIC BLOOD PRESSURE: 80 MMHG | WEIGHT: 164.38 LBS | SYSTOLIC BLOOD PRESSURE: 119 MMHG | OXYGEN SATURATION: 100 % | RESPIRATION RATE: 20 BRPM | TEMPERATURE: 98 F

## 2025-07-15 DIAGNOSIS — I34.0 MODERATE MITRAL REGURGITATION: ICD-10-CM

## 2025-07-15 DIAGNOSIS — E78.5 HYPERLIPIDEMIA, UNSPECIFIED HYPERLIPIDEMIA TYPE: ICD-10-CM

## 2025-07-15 DIAGNOSIS — I38 VALVULAR HEART DISEASE: ICD-10-CM

## 2025-07-15 DIAGNOSIS — I49.5 SICK SINUS SYNDROME: Primary | ICD-10-CM

## 2025-07-15 DIAGNOSIS — Z95.0 S/P PLACEMENT OF CARDIAC PACEMAKER: ICD-10-CM

## 2025-07-15 PROBLEM — E78.00 PURE HYPERCHOLESTEROLEMIA, UNSPECIFIED: Status: RESOLVED | Noted: 2025-06-05 | Resolved: 2025-07-15

## 2025-07-15 PROBLEM — E78.00 HYPERCHOLESTEREMIA: Status: RESOLVED | Noted: 2022-05-13 | Resolved: 2025-07-15

## 2025-07-15 PROCEDURE — 99214 OFFICE O/P EST MOD 30 MIN: CPT | Mod: PBBFAC | Performed by: INTERNAL MEDICINE

## 2025-07-15 RX ORDER — CHOLECALCIFEROL (VITAMIN D3) 25 MCG
1000 TABLET ORAL DAILY
COMMUNITY

## 2025-07-15 RX ORDER — CETIRIZINE HYDROCHLORIDE 10 MG/1
10 TABLET ORAL
COMMUNITY
Start: 2025-04-22

## 2025-07-15 RX ORDER — FAMOTIDINE 20 MG/1
10 TABLET, FILM COATED ORAL
COMMUNITY

## 2025-07-15 RX ORDER — ASCORBIC ACID 250 MG
250 TABLET ORAL DAILY
COMMUNITY

## 2025-07-15 NOTE — PROGRESS NOTES
[facility]  Outpatient Cardiology Clinic    Chief Complaint: f/u denies chest pain or sob since LV no questions     Rad Reyna is a 69 y.o. male with a PMHx of SSS s/p [employer]tr[employer] dual chamber PPM 7/2021, mild AI, mild to moderate MR, and HLD who presents to the clinic for follow-up. Patient reports he has been doing well. Endorses some LE swelling when he stands up for too long, but not associated with any pain. Denies chest pain, SOB, palpitations, dizziness, weakness, loss of conciousness, N/V, dysuria, hematuria, orthopnea.                                                                                                                                                                                                                                                                                                                                                                                                                                                                            CARDIAC TESTING:  Results for orders placed during the hospital encounter of 03/21/25    Echo    Interpretation Summary    Left Ventricle: The left ventricle is moderately dilated. Normal wall thickness. There is normal systolic function. Quantitated ejection fraction is 59%.    Right Ventricle: The right ventricle is normal in size. Systolic function is normal.    Left Atrium: Moderately dilated    Right Atrium: Right atrium is dilated.    Aortic Valve: The aortic valve is a trileaflet valve. There is no stenosis. Aortic valve peak velocity is 1.6 m/s. Mean gradient is 6 mmHg. There is mild aortic regurgitation.    Mitral Valve: Mildly thickened leaflets. There is prolapse of the anterior mitral leaflet. There is moderate regurgitation.    Tricuspid Valve: The tricuspid valve is structurally normal. There is mild regurgitation.    Pulmonary Artery: The estimated pulmonary artery systolic pressure is 31 mmHg.    IVC/SVC: Intermediate venous  "pressure at 8 mmHg.    No results found for this or any previous visit.     No results found for this or any previous visit.         Problem List[1]  Past Surgical History:   Procedure Laterality Date    INSERT / REPLACE / REMOVE PACEMAKER       Social History[2]     Family History   Problem Relation Name Age of Onset    Heart attack Mother      Hypertension Father       Review of patient's allergies indicates:   Allergen Reactions    Aspirin Other (See Comments)     Stomach burning    Atorvastatin      Other reaction(s): Stomach cramps    Nsaids (non-steroidal anti-inflammatory drug) Nausea Only    Penicillins Rash     Other reaction(s): rash         ROS:                                                                                                                                                                             Negative except as stated in the history of present illness. See HPI for details.    PHYSICAL EXAM:  Visit Vitals  /80 (BP Location: Left arm, Patient Position: Sitting)   Pulse 64   Temp 97.7 °F (36.5 °C) (Oral)   Resp 20   Ht 5' 10" (1.778 m)   Wt 74.6 kg (164 lb 6.4 oz)   SpO2 100%   BMI 23.59 kg/m²     General: alert and oriented/no acute distress  Eye: EOMI/normal conjunctiva/no xanthelasma  HENT: normocephalic/moist oral mucosa  Neck: supple/nontender/no carotid bruit; no LAD   Respiratory: lungs CTA/nonlabored respirations/BS equal/symmetrical expansion  Cardiovascular: grade 4/6 holosystolic blowing murmur heard throughout; normal rate/normal rhythm/normal peripheral perfusion/no edema/no JVD  Gastrointestinal: soft/nontender; non-distended; bowel sounds present   Musculoskeletal: moves all  Integumentary: warm/dry  Neurologic: alert/oriented/normal sensory/no focal deficits  Psychiatric: cooperative/appropriate mood and affect/normal judgment    Current Outpatient Medications   Medication Instructions    ascorbic acid (vitamin C) (VITAMIN C) 250 mg, Daily    cetirizine (ZYRTEC) 10 " mg, As needed (PRN)    famotidine (PEPCID) 10 mg, Oral, As needed (PRN)    multivit-mins/iron/folic/lycop (CENTRUM MEN ORAL) 1 tablet, Daily    sildenafiL (VIAGRA) 50 mg, Oral, Daily PRN    vitamin D (VITAMIN D3) 1,000 Units, Daily      All medications, laboratory studies, cardiac diagnostic imaging independently reviewed.     Lab Results   Component Value Date    CHOL 180 10/20/2023    CHOL 220 (H) 02/22/2023    HDL 56 10/20/2023    HDL 64 (H) 02/22/2023    .00 10/20/2023    .00 (H) 02/22/2023    TRIG 44 10/20/2023    TRIG 63 02/22/2023    TROPONINI <0.010 04/28/2025    TROPONINI <0.010 02/16/2025    TROPONINI <0.010 02/13/2025    CREATININE 1.32 (H) 04/28/2025    CREATININE 1.10 02/16/2025    MG 1.80 02/13/2025    K 4.0 04/28/2025    K 3.6 02/16/2025      ASSESSMENT/PLAN:  Sick Sinus Syndrome  Dual-chamber PPM on 7/21/21 ([employer]tr[employer]). Last device interrogation 3/11/2025 showed > 95% A pacing, < 0.1% V pacing. Episodes of AHR and VHR, 3 episodes of atrial SVT w/ -160 lasting 1 min 5 sec; 7 ventricular VTNs with -170 with only 1 of ventricular origin lasting 4 seconds in Jan 2025, pt did not recall feeling this episode  -Continue device interrogation q 6 months; next appt 9/2025    Valvular Heart Disease  TTE  3/21/2025 showed moderately dilated LV, EF 59%, moderately dilated LA, dilated RA, mild AR, mod MR due to anterior leaflet prolapse.  - repeat echo in 1 year ordered for 3/2026    Hypercholesterolemia  Last lipid profile 10/20/2023 shows , , HDL 56 and TG 44.  Patient with no cardiac risk factors.  ASCVD score 8% mostly driven by age and race.  Ideal score is 7.5%.    - Lipid panel ordered today     RTC in 1 year for follow-up.     Case was discussed with Dr. Barnd. Please appreciate attending's attestation to follow.     Corazon Ruiz MD  Department of Internal Medicine, PGY-1   [facility] - Cardiology  07/15/2025            [1]   Patient  Active Problem List  Diagnosis    Allergic rhinitis    Gastroesophageal reflux disease    Sick sinus syndrome    S/P placement of cardiac pacemaker    History of DVT (deep vein thrombosis)    Valvular heart disease    Mild aortic regurgitation    Moderate mitral regurgitation    Anemia    Eustachian tube dysfunction, left    Insomnia    Screening PSA (prostate specific antigen)    Mitral valve insufficiency    Acquired hallux valgus of left foot    Arthralgia of foot    Colonic polyp    Erectile dysfunction    Pes planus    Stage 2 chronic kidney disease    Swelling of first metatarsophalangeal (MTP) joint    Hyperlipidemia   [2]   Social History  Socioeconomic History    Marital status: Single   Tobacco Use    Smoking status: Never    Smokeless tobacco: Never   Substance and Sexual Activity    Alcohol use: Not Currently    Drug use: Not Currently     Social Drivers of Health     Food Insecurity: No Food Insecurity (12/1/2023)    Hunger Vital Sign     Worried About Running Out of Food in the Last Year: Never true     Ran Out of Food in the Last Year: Never true

## 2025-07-15 NOTE — PATIENT INSTRUCTIONS
REMINDERS:    Schedule/Complete ultrasound of heart (echo) prior to follow up    Remember to go to the 1st floor lab for FASTING blood work at least 1 week prior to follow up